# Patient Record
Sex: MALE | Race: BLACK OR AFRICAN AMERICAN | Employment: UNEMPLOYED | URBAN - METROPOLITAN AREA
[De-identification: names, ages, dates, MRNs, and addresses within clinical notes are randomized per-mention and may not be internally consistent; named-entity substitution may affect disease eponyms.]

---

## 2019-05-22 ENCOUNTER — APPOINTMENT (OUTPATIENT)
Dept: GENERAL RADIOLOGY | Age: 69
DRG: 100 | End: 2019-05-22
Attending: EMERGENCY MEDICINE
Payer: MEDICARE

## 2019-05-22 ENCOUNTER — APPOINTMENT (OUTPATIENT)
Dept: CT IMAGING | Age: 69
DRG: 100 | End: 2019-05-22
Attending: EMERGENCY MEDICINE
Payer: MEDICARE

## 2019-05-22 ENCOUNTER — HOSPITAL ENCOUNTER (INPATIENT)
Age: 69
LOS: 4 days | Discharge: HOME | DRG: 100 | End: 2019-05-26
Attending: EMERGENCY MEDICINE | Admitting: HOSPITALIST
Payer: MEDICARE

## 2019-05-22 DIAGNOSIS — R56.9 SEIZURE (HCC): Primary | ICD-10-CM

## 2019-05-22 DIAGNOSIS — E72.20 HYPERAMMONEMIA (HCC): ICD-10-CM

## 2019-05-22 DIAGNOSIS — G83.84 TODD'S PARALYSIS (HCC): ICD-10-CM

## 2019-05-22 PROBLEM — E87.6 HYPOKALEMIA: Status: ACTIVE | Noted: 2019-05-22

## 2019-05-22 PROBLEM — J96.00 ACUTE RESPIRATORY FAILURE (HCC): Status: ACTIVE | Noted: 2019-05-22

## 2019-05-22 PROBLEM — J69.0 ASPIRATION PNEUMONIA (HCC): Status: ACTIVE | Noted: 2019-05-22

## 2019-05-22 PROBLEM — E83.51 HYPOCALCEMIA: Status: ACTIVE | Noted: 2019-05-22

## 2019-05-22 LAB
ALBUMIN SERPL-MCNC: 2 G/DL (ref 3.4–5)
ALBUMIN/GLOB SERPL: 0.9 {RATIO} (ref 0.8–1.7)
ALP SERPL-CCNC: 66 U/L (ref 45–117)
ALT SERPL-CCNC: 20 U/L (ref 16–61)
AMMONIA PLAS-SCNC: 122 UMOL/L (ref 11–32)
AMPHET UR QL SCN: NEGATIVE
ANION GAP SERPL CALC-SCNC: 16 MMOL/L (ref 3–18)
ANION GAP SERPL CALC-SCNC: 8 MMOL/L (ref 3–18)
APAP SERPL-MCNC: 6 UG/ML (ref 10–30)
APPEARANCE UR: CLEAR
ARTERIAL PATENCY WRIST A: YES
AST SERPL-CCNC: 26 U/L (ref 15–37)
ATRIAL RATE: 100 BPM
ATRIAL RATE: 120 BPM
BACTERIA URNS QL MICRO: ABNORMAL /HPF
BARBITURATES UR QL SCN: NEGATIVE
BASE DEFICIT BLD-SCNC: 11 MMOL/L
BASOPHILS # BLD: 0 K/UL (ref 0–0.1)
BASOPHILS NFR BLD: 0 % (ref 0–2)
BDY SITE: ABNORMAL
BENZODIAZ UR QL: NEGATIVE
BILIRUB SERPL-MCNC: 0.2 MG/DL (ref 0.2–1)
BILIRUB UR QL: NEGATIVE
BODY TEMPERATURE: 98.6
BUN SERPL-MCNC: 10 MG/DL (ref 7–18)
BUN SERPL-MCNC: 14 MG/DL (ref 7–18)
BUN/CREAT SERPL: 10 (ref 12–20)
BUN/CREAT SERPL: 13 (ref 12–20)
CALCIUM SERPL-MCNC: 5.3 MG/DL (ref 8.5–10.1)
CALCIUM SERPL-MCNC: 8.2 MG/DL (ref 8.5–10.1)
CALCULATED P AXIS, ECG09: 72 DEGREES
CALCULATED P AXIS, ECG09: 77 DEGREES
CALCULATED R AXIS, ECG10: -90 DEGREES
CALCULATED R AXIS, ECG10: 15 DEGREES
CALCULATED T AXIS, ECG11: 64 DEGREES
CALCULATED T AXIS, ECG11: 72 DEGREES
CANNABINOIDS UR QL SCN: POSITIVE
CHLORIDE SERPL-SCNC: 110 MMOL/L (ref 100–108)
CHLORIDE SERPL-SCNC: 121 MMOL/L (ref 100–108)
CK MB CFR SERPL CALC: 0.8 % (ref 0–4)
CK MB CFR SERPL CALC: ABNORMAL % (ref 0–4)
CK MB SERPL-MCNC: 12 NG/ML (ref 5–25)
CK MB SERPL-MCNC: <1 NG/ML (ref 5–25)
CK SERPL-CCNC: 1466 U/L (ref 39–308)
CK SERPL-CCNC: 92 U/L (ref 39–308)
CO2 SERPL-SCNC: 11 MMOL/L (ref 21–32)
CO2 SERPL-SCNC: 24 MMOL/L (ref 21–32)
COCAINE UR QL SCN: NEGATIVE
COLOR UR: YELLOW
CREAT SERPL-MCNC: 0.77 MG/DL (ref 0.6–1.3)
CREAT SERPL-MCNC: 1.37 MG/DL (ref 0.6–1.3)
DIAGNOSIS, 93000: NORMAL
DIAGNOSIS, 93000: NORMAL
DIFFERENTIAL METHOD BLD: ABNORMAL
EOSINOPHIL # BLD: 0.1 K/UL (ref 0–0.4)
EOSINOPHIL NFR BLD: 1 % (ref 0–5)
EPITH CASTS URNS QL MICRO: ABNORMAL /LPF (ref 0–5)
ERYTHROCYTE [DISTWIDTH] IN BLOOD BY AUTOMATED COUNT: 12.9 % (ref 11.6–14.5)
EST. AVERAGE GLUCOSE BLD GHB EST-MCNC: 103 MG/DL
ETHANOL SERPL-MCNC: <3 MG/DL (ref 0–3)
GAS FLOW.O2 O2 DELIVERY SYS: ABNORMAL L/MIN
GAS FLOW.O2 SETTING OXYMISER: 14 BPM
GLOBULIN SER CALC-MCNC: 2.2 G/DL (ref 2–4)
GLUCOSE BLD STRIP.AUTO-MCNC: 102 MG/DL (ref 70–110)
GLUCOSE BLD STRIP.AUTO-MCNC: 84 MG/DL (ref 70–110)
GLUCOSE SERPL-MCNC: 140 MG/DL (ref 74–99)
GLUCOSE SERPL-MCNC: 83 MG/DL (ref 74–99)
GLUCOSE UR STRIP.AUTO-MCNC: NEGATIVE MG/DL
HBA1C MFR BLD: 5.2 % (ref 4.2–5.6)
HCO3 BLD-SCNC: 18.6 MMOL/L (ref 22–26)
HCT VFR BLD AUTO: 31.4 % (ref 36–48)
HDSCOM,HDSCOM: ABNORMAL
HGB BLD-MCNC: 10 G/DL (ref 13–16)
HGB UR QL STRIP: ABNORMAL
INSPIRATION.DURATION SETTING TIME VENT: 1 SEC
KETONES UR QL STRIP.AUTO: NEGATIVE MG/DL
LACTATE SERPL-SCNC: 1.9 MMOL/L (ref 0.4–2)
LEUKOCYTE ESTERASE UR QL STRIP.AUTO: NEGATIVE
LYMPHOCYTES # BLD: 1.3 K/UL (ref 0.9–3.6)
LYMPHOCYTES NFR BLD: 14 % (ref 21–52)
MAGNESIUM SERPL-MCNC: 1.6 MG/DL (ref 1.6–2.6)
MCH RBC QN AUTO: 29.9 PG (ref 24–34)
MCHC RBC AUTO-ENTMCNC: 31.8 G/DL (ref 31–37)
MCV RBC AUTO: 93.7 FL (ref 74–97)
METHADONE UR QL: NEGATIVE
MONOCYTES # BLD: 0.2 K/UL (ref 0.05–1.2)
MONOCYTES NFR BLD: 2 % (ref 3–10)
MUCOUS THREADS URNS QL MICRO: ABNORMAL /LPF
NEUTS SEG # BLD: 7.7 K/UL (ref 1.8–8)
NEUTS SEG NFR BLD: 83 % (ref 40–73)
NITRITE UR QL STRIP.AUTO: NEGATIVE
O2/TOTAL GAS SETTING VFR VENT: 100 %
OPIATES UR QL: NEGATIVE
P-R INTERVAL, ECG05: 156 MS
P-R INTERVAL, ECG05: 206 MS
PCO2 BLD: 56.1 MMHG (ref 35–45)
PCP UR QL: NEGATIVE
PEEP RESPIRATORY: 5 CMH2O
PH BLD: 7.13 [PH] (ref 7.35–7.45)
PH UR STRIP: 5.5 [PH] (ref 5–8)
PLATELET # BLD AUTO: 158 K/UL (ref 135–420)
PMV BLD AUTO: 9.3 FL (ref 9.2–11.8)
PO2 BLD: 285 MMHG (ref 80–100)
POTASSIUM SERPL-SCNC: 3.2 MMOL/L (ref 3.5–5.5)
POTASSIUM SERPL-SCNC: 4.5 MMOL/L (ref 3.5–5.5)
PROT SERPL-MCNC: 4.2 G/DL (ref 6.4–8.2)
PROT UR STRIP-MCNC: 300 MG/DL
Q-T INTERVAL, ECG07: 300 MS
Q-T INTERVAL, ECG07: 356 MS
QRS DURATION, ECG06: 100 MS
QRS DURATION, ECG06: 102 MS
QTC CALCULATION (BEZET), ECG08: 424 MS
QTC CALCULATION (BEZET), ECG08: 459 MS
RBC # BLD AUTO: 3.35 M/UL (ref 4.7–5.5)
RBC #/AREA URNS HPF: ABNORMAL /HPF (ref 0–5)
SALICYLATES SERPL-MCNC: 2.8 MG/DL (ref 2.8–20)
SAO2 % BLD: 100 % (ref 92–97)
SERVICE CMNT-IMP: ABNORMAL
SODIUM SERPL-SCNC: 142 MMOL/L (ref 136–145)
SODIUM SERPL-SCNC: 148 MMOL/L (ref 136–145)
SP GR UR REFRACTOMETRY: 1.02 (ref 1–1.03)
SPECIMEN TYPE: ABNORMAL
TOTAL RESP. RATE, ITRR: 14
TROPONIN I SERPL-MCNC: 0.08 NG/ML (ref 0–0.04)
TROPONIN I SERPL-MCNC: 0.35 NG/ML (ref 0–0.04)
UROBILINOGEN UR QL STRIP.AUTO: 0.2 EU/DL (ref 0.2–1)
VENTILATION MODE VENT: ABNORMAL
VENTRICULAR RATE, ECG03: 100 BPM
VENTRICULAR RATE, ECG03: 120 BPM
VT SETTING VENT: 450 ML
WBC # BLD AUTO: 9.3 K/UL (ref 4.6–13.2)
WBC URNS QL MICRO: ABNORMAL /HPF (ref 0–5)

## 2019-05-22 PROCEDURE — 85025 COMPLETE CBC W/AUTO DIFF WBC: CPT

## 2019-05-22 PROCEDURE — 96375 TX/PRO/DX INJ NEW DRUG ADDON: CPT

## 2019-05-22 PROCEDURE — 74011000250 HC RX REV CODE- 250: Performed by: HOSPITALIST

## 2019-05-22 PROCEDURE — 80185 ASSAY OF PHENYTOIN TOTAL: CPT

## 2019-05-22 PROCEDURE — 74011250636 HC RX REV CODE- 250/636

## 2019-05-22 PROCEDURE — 5A1935Z RESPIRATORY VENTILATION, LESS THAN 24 CONSECUTIVE HOURS: ICD-10-PCS | Performed by: HOSPITALIST

## 2019-05-22 PROCEDURE — 77010033678 HC OXYGEN DAILY

## 2019-05-22 PROCEDURE — 82962 GLUCOSE BLOOD TEST: CPT

## 2019-05-22 PROCEDURE — 0BH17EZ INSERTION OF ENDOTRACHEAL AIRWAY INTO TRACHEA, VIA NATURAL OR ARTIFICIAL OPENING: ICD-10-PCS | Performed by: EMERGENCY MEDICINE

## 2019-05-22 PROCEDURE — 82803 BLOOD GASES ANY COMBINATION: CPT

## 2019-05-22 PROCEDURE — 87070 CULTURE OTHR SPECIMN AEROBIC: CPT

## 2019-05-22 PROCEDURE — 74011000250 HC RX REV CODE- 250: Performed by: EMERGENCY MEDICINE

## 2019-05-22 PROCEDURE — 80053 COMPREHEN METABOLIC PANEL: CPT

## 2019-05-22 PROCEDURE — 82550 ASSAY OF CK (CPK): CPT

## 2019-05-22 PROCEDURE — 93005 ELECTROCARDIOGRAM TRACING: CPT

## 2019-05-22 PROCEDURE — 74011000250 HC RX REV CODE- 250: Performed by: INTERNAL MEDICINE

## 2019-05-22 PROCEDURE — 31500 INSERT EMERGENCY AIRWAY: CPT

## 2019-05-22 PROCEDURE — 74011250636 HC RX REV CODE- 250/636: Performed by: INTERNAL MEDICINE

## 2019-05-22 PROCEDURE — 81001 URINALYSIS AUTO W/SCOPE: CPT

## 2019-05-22 PROCEDURE — 99285 EMERGENCY DEPT VISIT HI MDM: CPT

## 2019-05-22 PROCEDURE — 80307 DRUG TEST PRSMV CHEM ANLYZR: CPT

## 2019-05-22 PROCEDURE — 87106 FUNGI IDENTIFICATION YEAST: CPT

## 2019-05-22 PROCEDURE — 74011000258 HC RX REV CODE- 258: Performed by: HOSPITALIST

## 2019-05-22 PROCEDURE — 02HV33Z INSERTION OF INFUSION DEVICE INTO SUPERIOR VENA CAVA, PERCUTANEOUS APPROACH: ICD-10-PCS | Performed by: EMERGENCY MEDICINE

## 2019-05-22 PROCEDURE — 70450 CT HEAD/BRAIN W/O DYE: CPT

## 2019-05-22 PROCEDURE — 74011000258 HC RX REV CODE- 258: Performed by: INTERNAL MEDICINE

## 2019-05-22 PROCEDURE — 71045 X-RAY EXAM CHEST 1 VIEW: CPT

## 2019-05-22 PROCEDURE — 36415 COLL VENOUS BLD VENIPUNCTURE: CPT

## 2019-05-22 PROCEDURE — 96365 THER/PROPH/DIAG IV INF INIT: CPT

## 2019-05-22 PROCEDURE — 74011250636 HC RX REV CODE- 250/636: Performed by: EMERGENCY MEDICINE

## 2019-05-22 PROCEDURE — 95816 EEG AWAKE AND DROWSY: CPT | Performed by: HOSPITALIST

## 2019-05-22 PROCEDURE — 94002 VENT MGMT INPAT INIT DAY: CPT

## 2019-05-22 PROCEDURE — 83735 ASSAY OF MAGNESIUM: CPT

## 2019-05-22 PROCEDURE — 74011250636 HC RX REV CODE- 250/636: Performed by: HOSPITALIST

## 2019-05-22 PROCEDURE — 36600 WITHDRAWAL OF ARTERIAL BLOOD: CPT

## 2019-05-22 PROCEDURE — 83036 HEMOGLOBIN GLYCOSYLATED A1C: CPT

## 2019-05-22 PROCEDURE — 82140 ASSAY OF AMMONIA: CPT

## 2019-05-22 PROCEDURE — 87040 BLOOD CULTURE FOR BACTERIA: CPT

## 2019-05-22 PROCEDURE — 65610000006 HC RM INTENSIVE CARE

## 2019-05-22 PROCEDURE — 96361 HYDRATE IV INFUSION ADD-ON: CPT

## 2019-05-22 PROCEDURE — 83605 ASSAY OF LACTIC ACID: CPT

## 2019-05-22 RX ORDER — PROPOFOL 10 MG/ML
0-50 VIAL (ML) INTRAVENOUS CONTINUOUS
Status: DISCONTINUED | OUTPATIENT
Start: 2019-05-22 | End: 2019-05-24

## 2019-05-22 RX ORDER — LEVETIRACETAM 10 MG/ML
1000 INJECTION INTRAVASCULAR EVERY 12 HOURS
Status: DISCONTINUED | OUTPATIENT
Start: 2019-05-22 | End: 2019-05-22 | Stop reason: ALTCHOICE

## 2019-05-22 RX ORDER — CHLORHEXIDINE GLUCONATE 1.2 MG/ML
10 RINSE ORAL EVERY 12 HOURS
Status: DISCONTINUED | OUTPATIENT
Start: 2019-05-22 | End: 2019-05-23

## 2019-05-22 RX ORDER — FENTANYL CITRATE 50 UG/ML
25 INJECTION, SOLUTION INTRAMUSCULAR; INTRAVENOUS
Status: DISCONTINUED | OUTPATIENT
Start: 2019-05-22 | End: 2019-05-26 | Stop reason: HOSPADM

## 2019-05-22 RX ORDER — LEVETIRACETAM 10 MG/ML
1000 INJECTION INTRAVASCULAR
Status: COMPLETED | OUTPATIENT
Start: 2019-05-22 | End: 2019-05-22

## 2019-05-22 RX ORDER — HEPARIN SODIUM 5000 [USP'U]/ML
5000 INJECTION, SOLUTION INTRAVENOUS; SUBCUTANEOUS EVERY 8 HOURS
Status: DISCONTINUED | OUTPATIENT
Start: 2019-05-22 | End: 2019-05-23

## 2019-05-22 RX ORDER — DEXTROSE 50 % IN WATER (D50W) INTRAVENOUS SYRINGE
25-50 AS NEEDED
Status: DISCONTINUED | OUTPATIENT
Start: 2019-05-22 | End: 2019-05-26 | Stop reason: HOSPADM

## 2019-05-22 RX ORDER — IPRATROPIUM BROMIDE AND ALBUTEROL SULFATE 2.5; .5 MG/3ML; MG/3ML
3 SOLUTION RESPIRATORY (INHALATION)
Status: DISCONTINUED | OUTPATIENT
Start: 2019-05-22 | End: 2019-05-26 | Stop reason: HOSPADM

## 2019-05-22 RX ORDER — LACOSAMIDE 100 MG/1
100 TABLET ORAL 2 TIMES DAILY
COMMUNITY
End: 2019-05-26

## 2019-05-22 RX ORDER — MAGNESIUM SULFATE 100 %
4 CRYSTALS MISCELLANEOUS AS NEEDED
Status: DISCONTINUED | OUTPATIENT
Start: 2019-05-22 | End: 2019-05-26 | Stop reason: HOSPADM

## 2019-05-22 RX ORDER — PROPOFOL 10 MG/ML
INJECTION, EMULSION INTRAVENOUS
Status: COMPLETED
Start: 2019-05-22 | End: 2019-05-22

## 2019-05-22 RX ORDER — CHLORHEXIDINE GLUCONATE 1.2 MG/ML
10 RINSE ORAL ONCE
Status: COMPLETED | OUTPATIENT
Start: 2019-05-22 | End: 2019-05-22

## 2019-05-22 RX ORDER — IPRATROPIUM BROMIDE AND ALBUTEROL SULFATE 2.5; .5 MG/3ML; MG/3ML
3 SOLUTION RESPIRATORY (INHALATION)
Status: DISCONTINUED | OUTPATIENT
Start: 2019-05-22 | End: 2019-05-22 | Stop reason: SDUPTHER

## 2019-05-22 RX ORDER — CLINDAMYCIN PHOSPHATE 600 MG/50ML
600 INJECTION, SOLUTION INTRAVENOUS EVERY 8 HOURS
Status: DISCONTINUED | OUTPATIENT
Start: 2019-05-22 | End: 2019-05-23

## 2019-05-22 RX ORDER — MIDAZOLAM HYDROCHLORIDE 1 MG/ML
1 INJECTION, SOLUTION INTRAMUSCULAR; INTRAVENOUS
Status: DISCONTINUED | OUTPATIENT
Start: 2019-05-22 | End: 2019-05-26 | Stop reason: HOSPADM

## 2019-05-22 RX ORDER — POTASSIUM CHLORIDE 7.45 MG/ML
10 INJECTION INTRAVENOUS
Status: COMPLETED | OUTPATIENT
Start: 2019-05-22 | End: 2019-05-23

## 2019-05-22 RX ORDER — INSULIN LISPRO 100 [IU]/ML
INJECTION, SOLUTION INTRAVENOUS; SUBCUTANEOUS
Status: DISCONTINUED | OUTPATIENT
Start: 2019-05-22 | End: 2019-05-22

## 2019-05-22 RX ORDER — ETOMIDATE 2 MG/ML
20 INJECTION INTRAVENOUS
Status: COMPLETED | OUTPATIENT
Start: 2019-05-22 | End: 2019-05-22

## 2019-05-22 RX ORDER — ROCURONIUM BROMIDE 10 MG/ML
0.6 INJECTION, SOLUTION INTRAVENOUS
Status: COMPLETED | OUTPATIENT
Start: 2019-05-22 | End: 2019-05-22

## 2019-05-22 RX ORDER — INSULIN LISPRO 100 [IU]/ML
INJECTION, SOLUTION INTRAVENOUS; SUBCUTANEOUS EVERY 6 HOURS
Status: DISCONTINUED | OUTPATIENT
Start: 2019-05-23 | End: 2019-05-24

## 2019-05-22 RX ORDER — LEVETIRACETAM 750 MG/1
1500 TABLET ORAL 2 TIMES DAILY
COMMUNITY
End: 2019-05-26

## 2019-05-22 RX ORDER — DEXTROSE MONOHYDRATE AND SODIUM CHLORIDE 5; .45 G/100ML; G/100ML
125 INJECTION, SOLUTION INTRAVENOUS CONTINUOUS
Status: DISCONTINUED | OUTPATIENT
Start: 2019-05-22 | End: 2019-05-24

## 2019-05-22 RX ORDER — SODIUM CHLORIDE 9 MG/ML
125 INJECTION, SOLUTION INTRAVENOUS CONTINUOUS
Status: DISCONTINUED | OUTPATIENT
Start: 2019-05-22 | End: 2019-05-22

## 2019-05-22 RX ORDER — LEVETIRACETAM 15 MG/ML
1500 INJECTION INTRAVASCULAR EVERY 12 HOURS
Status: DISCONTINUED | OUTPATIENT
Start: 2019-05-22 | End: 2019-05-23

## 2019-05-22 RX ORDER — POTASSIUM CHLORIDE 7.45 MG/ML
INJECTION INTRAVENOUS
Status: DISPENSED
Start: 2019-05-22 | End: 2019-05-23

## 2019-05-22 RX ADMIN — LEVETIRACETAM 1000 MG: 10 INJECTION INTRAVENOUS at 13:55

## 2019-05-22 RX ADMIN — POTASSIUM CHLORIDE 10 MEQ: 10 INJECTION, SOLUTION INTRAVENOUS at 19:06

## 2019-05-22 RX ADMIN — LEVETIRACETAM 1500 MG: 15 INJECTION INTRAVENOUS at 22:36

## 2019-05-22 RX ADMIN — SODIUM CHLORIDE 1000 ML: 900 INJECTION, SOLUTION INTRAVENOUS at 14:37

## 2019-05-22 RX ADMIN — PROPOFOL 40 MCG/KG/MIN: 10 INJECTION, EMULSION INTRAVENOUS at 22:41

## 2019-05-22 RX ADMIN — DEXTROSE MONOHYDRATE AND SODIUM CHLORIDE 125 ML/HR: 5; .45 INJECTION, SOLUTION INTRAVENOUS at 18:38

## 2019-05-22 RX ADMIN — ETOMIDATE 20 MG: 2 INJECTION, SOLUTION INTRAVENOUS at 13:31

## 2019-05-22 RX ADMIN — POTASSIUM CHLORIDE 10 MEQ: 10 INJECTION, SOLUTION INTRAVENOUS at 17:56

## 2019-05-22 RX ADMIN — CHLORHEXIDINE GLUCONATE 10 ML: 1.2 RINSE ORAL at 17:57

## 2019-05-22 RX ADMIN — SODIUM CHLORIDE 100 ML/HR: 900 INJECTION, SOLUTION INTRAVENOUS at 15:28

## 2019-05-22 RX ADMIN — NOREPINEPHRINE BITARTRATE 2 MCG/MIN: 1 INJECTION, SOLUTION, CONCENTRATE INTRAVENOUS at 15:59

## 2019-05-22 RX ADMIN — CALCIUM GLUCONATE 2 G: 94 INJECTION, SOLUTION INTRAVENOUS at 16:21

## 2019-05-22 RX ADMIN — PHENYTOIN SODIUM 300 MG: 50 INJECTION INTRAMUSCULAR; INTRAVENOUS at 23:54

## 2019-05-22 RX ADMIN — CHLORHEXIDINE GLUCONATE 10 ML: 1.2 RINSE ORAL at 20:36

## 2019-05-22 RX ADMIN — PIPERACILLIN SODIUM,TAZOBACTAM SODIUM 3.38 G: 3; .375 INJECTION, POWDER, FOR SOLUTION INTRAVENOUS at 20:35

## 2019-05-22 RX ADMIN — CLINDAMYCIN PHOSPHATE 600 MG: 600 INJECTION, SOLUTION INTRAVENOUS at 23:55

## 2019-05-22 RX ADMIN — PROPOFOL 10 MCG/KG/MIN: 10 INJECTION, EMULSION INTRAVENOUS at 15:16

## 2019-05-22 RX ADMIN — HEPARIN SODIUM 5000 UNITS: 5000 INJECTION INTRAVENOUS; SUBCUTANEOUS at 17:56

## 2019-05-22 RX ADMIN — SODIUM CHLORIDE 1000 ML: 900 INJECTION, SOLUTION INTRAVENOUS at 13:53

## 2019-05-22 RX ADMIN — FOLIC ACID: 5 INJECTION, SOLUTION INTRAMUSCULAR; INTRAVENOUS; SUBCUTANEOUS at 15:24

## 2019-05-22 RX ADMIN — ROCURONIUM BROMIDE 75 MG: 50 INJECTION, SOLUTION INTRAVENOUS at 13:31

## 2019-05-22 RX ADMIN — POTASSIUM CHLORIDE 10 MEQ: 10 INJECTION, SOLUTION INTRAVENOUS at 22:37

## 2019-05-22 RX ADMIN — SODIUM CHLORIDE 1000 ML: 900 INJECTION, SOLUTION INTRAVENOUS at 13:37

## 2019-05-22 RX ADMIN — CLINDAMYCIN PHOSPHATE 600 MG: 600 INJECTION, SOLUTION INTRAVENOUS at 17:55

## 2019-05-22 RX ADMIN — PIPERACILLIN SODIUM,TAZOBACTAM SODIUM 3.38 G: 3; .375 INJECTION, POWDER, FOR SOLUTION INTRAVENOUS at 16:18

## 2019-05-22 RX ADMIN — POTASSIUM CHLORIDE 10 MEQ: 10 INJECTION, SOLUTION INTRAVENOUS at 20:35

## 2019-05-22 RX ADMIN — PROPOFOL 40 MCG/KG/MIN: 10 INJECTION, EMULSION INTRAVENOUS at 18:01

## 2019-05-22 RX ADMIN — HEPARIN SODIUM 5000 UNITS: 5000 INJECTION INTRAVENOUS; SUBCUTANEOUS at 23:59

## 2019-05-22 NOTE — ED PROVIDER NOTES
EMERGENCY DEPARTMENT HISTORY AND PHYSICAL EXAM    Date: 5/22/2019  Patient Name: Summer Castelan    History of Presenting Illness     Chief Complaint   Patient presents with    Unresponsive         History Provided By: EMS    Additional History (Context):   Summer Castelan is a 79 y.o. male with PMHX of seizures presents to the emergency department after witnessed seizure. Unclear what patient takes for seizures and when his last seizure was. It is reported that the patient was sitting outside and when security pass noticed that the patient was having seizure. When paramedics arrived, patient received Ativan which stopped seizures. However on arrival, patient was noted to be hypoxic at 86% on nonrebreather. Minimally responsive, only opening his eyes and nodding. Unable to verbalize history. On assessment, noted to have left-sided flaccid paralysis. Glucose in the 200s. Initially noted to be hypertensive however became hypotensive. PCP: Bryan, MD Naveen        Past History     Past Medical History:  No past medical history on file. Past Surgical History:  No past surgical history on file. Family History:  No family history on file. Social History:  Social History     Tobacco Use    Smoking status: Not on file   Substance Use Topics    Alcohol use: Not on file    Drug use: Not on file       Allergies:  Not on File      Review of Systems   Review of Systems   Unable to perform ROS: Patient unresponsive       Physical Exam     Vitals:    05/22/19 1430 05/22/19 1435 05/22/19 1440 05/22/19 1446   BP: (!) 169/95 (!) 169/99 (!) 170/106    Pulse: (!) 116 (!) 111 (!) 113    Resp: 18 19 22    Temp:    97.2 °F (36.2 °C)   SpO2: 95% 95% 94%    Weight:         Physical Exam    Nursing note and vitals reviewed      CONSTITUTIONAL: Well-developed, well-nourished individual HEAD: Normocephalic, atraumatic. EYES: Pupils are 4 mm bilaterally, reactive to light.   Eyelids, conjunctiva, iris, and sclera are normal.  EARS: External ears are normal.  NOSE: The nose is normal in appearance. MOUTH/DENTAL: Dry mucous membranes   Neck: The trachea is mid-line. The neck is supple and non-tender to palpation. There is no cervical lymphadenopathy. There is no JVD. CHEST: No evidence of trauma or deformity. Non-tender to palpation. CARDIOVASCULAR: Tachycardic, no murmurs. LUNGS: Equal breath sounds, no wheezes or rhonchi   GI/ABDOMEN: The abdomen is mildly distended in appearance. PELVIS: No evidence of trauma or deformity. Negative pelvic rock. no evidence of instability. MUSCULOSKELETAL: There are no deformities noted in all four extremities. NEUROLOGICAL: Moving his right upper and right lower extremities volitionally. Flaccid paralysis to his left upper and left lower extremity. Not moving his left side to pain. PSYCHOLOGICAL: Unresponsive.          Diagnostic Study Results     Labs -     Recent Results (from the past 12 hour(s))   EKG, 12 LEAD, INITIAL    Collection Time: 05/22/19  1:24 PM   Result Value Ref Range    Ventricular Rate 120 BPM    Atrial Rate 120 BPM    P-R Interval 156 ms    QRS Duration 100 ms    Q-T Interval 300 ms    QTC Calculation (Bezet) 424 ms    Calculated P Axis 72 degrees    Calculated R Axis -90 degrees    Calculated T Axis 72 degrees    Diagnosis       Sinus tachycardia  Right superior axis deviation  Incomplete right bundle branch block  Possible Right ventricular hypertrophy  Inferior infarct , age undetermined  Abnormal ECG  No previous ECGs available  Confirmed by Catalina Abreu MD, -- ([id number]) on 5/22/2019 1:54:47 PM     CBC WITH AUTOMATED DIFF    Collection Time: 05/22/19  1:39 PM   Result Value Ref Range    WBC 9.3 4.6 - 13.2 K/uL    RBC 3.35 (L) 4.70 - 5.50 M/uL    HGB 10.0 (L) 13.0 - 16.0 g/dL    HCT 31.4 (L) 36.0 - 48.0 %    MCV 93.7 74.0 - 97.0 FL    MCH 29.9 24.0 - 34.0 PG    MCHC 31.8 31.0 - 37.0 g/dL    RDW 12.9 11.6 - 14.5 %    PLATELET 584 487 - 705 K/uL    MPV 9.3 9.2 - 11.8 FL    NEUTROPHILS 83 (H) 40 - 73 %    LYMPHOCYTES 14 (L) 21 - 52 %    MONOCYTES 2 (L) 3 - 10 %    EOSINOPHILS 1 0 - 5 %    BASOPHILS 0 0 - 2 %    ABS. NEUTROPHILS 7.7 1.8 - 8.0 K/UL    ABS. LYMPHOCYTES 1.3 0.9 - 3.6 K/UL    ABS. MONOCYTES 0.2 0.05 - 1.2 K/UL    ABS. EOSINOPHILS 0.1 0.0 - 0.4 K/UL    ABS. BASOPHILS 0.0 0.0 - 0.1 K/UL    DF AUTOMATED     METABOLIC PANEL, COMPREHENSIVE    Collection Time: 05/22/19  1:39 PM   Result Value Ref Range    Sodium 148 (H) 136 - 145 mmol/L    Potassium 3.2 (L) 3.5 - 5.5 mmol/L    Chloride 121 (H) 100 - 108 mmol/L    CO2 11 (L) 21 - 32 mmol/L    Anion gap 16 3.0 - 18 mmol/L    Glucose 140 (H) 74 - 99 mg/dL    BUN 10 7.0 - 18 MG/DL    Creatinine 0.77 0.6 - 1.3 MG/DL    BUN/Creatinine ratio 13 12 - 20      GFR est AA >60 >60 ml/min/1.73m2    GFR est non-AA >60 >60 ml/min/1.73m2    Calcium 5.3 (LL) 8.5 - 10.1 MG/DL    Bilirubin, total 0.2 0.2 - 1.0 MG/DL    ALT (SGPT) 20 16 - 61 U/L    AST (SGOT) 26 15 - 37 U/L    Alk.  phosphatase 66 45 - 117 U/L    Protein, total 4.2 (L) 6.4 - 8.2 g/dL    Albumin 2.0 (L) 3.4 - 5.0 g/dL    Globulin 2.2 2.0 - 4.0 g/dL    A-G Ratio 0.9 0.8 - 1.7     MAGNESIUM    Collection Time: 05/22/19  1:39 PM   Result Value Ref Range    Magnesium 1.6 1.6 - 2.6 mg/dL   CARDIAC PANEL,(CK, CKMB & TROPONIN)    Collection Time: 05/22/19  1:39 PM   Result Value Ref Range    CK 92 39 - 308 U/L    CK - MB <1.0 <3.6 ng/ml    CK-MB Index  0.0 - 4.0 %     CALCULATION NOT PERFORMED WHEN RESULT IS BELOW LINEAR LIMIT    Troponin-I, QT 0.08 (H) 0.0 - 0.045 NG/ML   AMMONIA    Collection Time: 05/22/19  1:39 PM   Result Value Ref Range    Ammonia 122 (H) 11 - 32 UMOL/L   POC G3    Collection Time: 05/22/19  2:04 PM   Result Value Ref Range    Device: VENT      FIO2 (POC) 100 %    pH (POC) 7.127 (LL) 7.35 - 7.45      pCO2 (POC) 56.1 (H) 35.0 - 45.0 MMHG    pO2 (POC) 285 (H) 80 - 100 MMHG    HCO3 (POC) 18.6 (L) 22 - 26 MMOL/L    sO2 (POC) 100 (H) 92 - 97 %    Base deficit (POC) 11 mmol/L    Mode ASSIST CONTROL      Tidal volume 450 ml    Set Rate 14 bpm    PEEP/CPAP (POC) 5.0 cmH2O    Allens test (POC) YES      Inspiratory Time 1.0 sec    Total resp. rate 14      Site RIGHT RADIAL      Patient temp. 98.6      Specimen type (POC) ARTERIAL      Performed by Sugey Burns W/ KAMI MICROSCOPIC    Collection Time: 05/22/19  2:27 PM   Result Value Ref Range    Color YELLOW      Appearance CLEAR      Specific gravity 1.018 1.005 - 1.030      pH (UA) 5.5 5.0 - 8.0      Protein 300 (A) NEG mg/dL    Glucose NEGATIVE  NEG mg/dL    Ketone NEGATIVE  NEG mg/dL    Bilirubin NEGATIVE  NEG      Blood MODERATE (A) NEG      Urobilinogen 0.2 0.2 - 1.0 EU/dL    Nitrites NEGATIVE  NEG      Leukocyte Esterase NEGATIVE  NEG         Radiologic Studies -   CT HEAD WO CONT   Final Result         1. No acute intracranial abnormality. 2. Mild periventricular white matter low-attenuation; nonspecific finding   favored to reflect sequela of chronic ischemic microvascular change. CRITICAL RESULT:  CODE S results called to Dr. Charles Alvarado in the emergency room   prior to dictation at 1425 hours on 5/22/2019      XR CHEST PORT    (Results Pending)     CT Results  (Last 48 hours)               05/22/19 1421  CT HEAD WO CONT Final result    Impression:          1. No acute intracranial abnormality. 2. Mild periventricular white matter low-attenuation; nonspecific finding   favored to reflect sequela of chronic ischemic microvascular change. CRITICAL RESULT:  CODE S results called to Dr. Charles Alvarado in the emergency room   prior to dictation at 1425 hours on 5/22/2019       Narrative:  EXAM: CT head       INDICATION: Seizure, patient unresponsive. COMPARISON: None.        TECHNIQUE: Axial CT imaging of the head was performed without intravenous   contrast.       One or more dose reduction techniques were used on this CT: automated exposure   control, adjustment of the mAs and/or kVp according to patient size, and   iterative reconstruction techniques. The specific techniques used on this CT   exam have been documented in the patient's electronic medical record. Digital   Imaging and Communications in Medicine (DICOM) format image data are available   to nonaffiliated external healthcare facilities or entities on a secure, media   free, reciprocally searchable basis with patient authorization for at least a   12-month period after this study. _______________       FINDINGS:       BRAIN AND POSTERIOR FOSSA: Cortical sulci volume is within normal limits for   patient age. The ventricular size and configuration is normal. Physiologic   bilateral basal ganglia calcifications are present. There is no intracranial   hemorrhage, mass effect, or midline shift. The gray-white matter differentiation   is within normal limits. Periventricular white matter low-attenuation is   present. EXTRA-AXIAL SPACES AND MENINGES: There are no abnormal extra-axial fluid   collections. CALVARIUM: Intact. SINUSES: Clear. OTHER: None.       _______________               CXR Results  (Last 48 hours)    None            Medical Decision Making   I am the first provider for this patient. I reviewed the vital signs, available nursing notes, past medical history, past surgical history, family history and social history. Vital Signs-Reviewed the patient's vital signs. Pulse Oximetry Analysis -86 % on nonrebreather    Cardiac Monitor:  Rate: 113 bpm  Rhythm: Regular    EKG interpretation: (Preliminary)  2:05 PM  Sinus tachycardia 120 bpm.  QTc 424 ms. Right bundle branch block. 2:52 PM normal sinus rhythm 100 bpm.  QTc 459 ms. Right bundle branch block    Records Reviewed: Nursing Notes and Old Medical Records    Provider Notes:   79 y.o. male presenting after witnessed seizure. On assessment, patient noted to be hypertensive, tachycardic however afebrile.   Hypoxic on nonrebreather saturating 86%. Noted to also have left-sided flaccid paralysis, possibly secondary to Pako's paralysis however noted to be hyportensive. Concern or apnea, unable to protect his airway. Patient intubated using etomidate and rocuronium. 7.5 tube using glide scope MAC 4.  24 at the lip.  code stroke initiated. Will initiate for any other line causes for his acute mental status. Will check ammonia, Tylenol, alcohol and salicylate levels. We will also obtain UDS. Will check cardiac enzymes and EKG. Will require admission to the ICU. Will load with 1 g of Keppra. Will consult neurology. Procedures:  Intubation  Date/Time: 5/22/2019 4:01 PM  Performed by: Alyson Chi DO  Authorized by: Alyson Chi DO     Consent:     Consent obtained:  Verbal    Consent given by:  Patient    Risks discussed:  Aspiration and bleeding  Pre-procedure details:     Patient status:  Unresponsive    Mallampati score:  II    Paralytics:  Rocuronium  Procedure details:     Preoxygenation:  Nonrebreather mask    CPR in progress: no      Intubation method:  Oral    Oral intubation technique:  Video-assisted    Laryngoscope blade: Mac 4    Tube size (mm):  7.5    Tube type:  Cuffed    Number of attempts:  1    Ventilation between attempts: no      Cricoid pressure: no      Tube visualized through cords: no    Placement assessment:     ETT to lip:  24    Tube secured with:  ETT pineda    Placement verification: chest rise, CXR verification and ETCO2 detector      CXR findings:  ETT in proper place  Post-procedure details:     Patient tolerance of procedure:   Tolerated well, no immediate complications    Central Line  Date/Time: 5/22/2019 4:04 PM  Performed by: Alyson Chi DO  Authorized by: Alyson Chi DO     Consent:     Consent obtained:  Emergent situation    Risks discussed:  Bleeding, infection and incorrect placement    Alternatives discussed:  No treatment and delayed treatment  Pre-procedure details:     Hand hygiene: Hand hygiene performed prior to insertion      Sterile barrier technique: All elements of maximal sterile technique followed      Skin preparation:  ChloraPrep    Skin preparation agent: Skin preparation agent completely dried prior to procedure    Anesthesia (see MAR for exact dosages): Anesthesia method:  None  Procedure details:     Location:  R internal jugular    Patient position:  Reverse Trendelenburg    Catheter size:  7 Fr    Landmarks identified: yes      Ultrasound guidance: yes      Sterile ultrasound techniques: Sterile gel and sterile probe covers were used      Number of attempts:  1    Successful placement: yes    Post-procedure details:     Post-procedure:  Dressing applied and line sutured    Assessment:  Blood return through all ports    Patient tolerance of procedure: Tolerated well, no immediate complications        ED Course:   1:15 PM   Initial assessment performed. The patients presenting problems have been discussed, and they are in agreement with the care plan formulated and outlined with them. I have encouraged them to ask questions as they arise throughout their visit. ED Course as of May 22 1448   Wed May 22, 2019   1342 1:42 PM Discussed patient's history, exam, and available diagnostics results with Dr. Dilia Steven tele-neurologist, who agree with imaging, EEG, MRI during ICU admission. Recommend Keppra. [PZ]   8979 2:21 PM Discussed patient's history, exam, and available diagnostics results with Baltazar Morales MD (Neurology) who will call in for a stat EEG      [CA]   6740 Spoke to daughter who does not live in that state. She reports that her father does have a history of epilepsy however unsure what medications he takes for it. States that his seizures are triggered by stress. States that the patient has been living in [place] for approximately 1 year.     2:27 PM Discussed patient's history, exam, and available diagnostics results with Ramin Maxwell  Radiologist.  No acute intracranial hemorrhage. [CA]   46 2:36 PM Discussed patient's history, exam, and available diagnostics results with Chai Vivas MD (Intensivist)who agree with admission ICU      [CA]      ED Course User Index  [CA] Cliff Hatfield DO Bria     After intubation, patient noted to be hypertensive, attempting to reach for the ET tube. Patient moving all 4 extremities on his own volition. Clinical suspicion for Pako's paralysis from his initial flaccid paralysis. CT negative for intracranial hemorrhage. Started patient on soft restraint and propofol drip for sedation. Patient given 1 g of Keppra. Diagnosis and Disposition     5:35 PM  I have spent 75 minutes of critical care time involved in lab review, consultations with specialist, family decision-making, and documentation. During this entire length of time I was immediately available to the patient. Critical Care: The reason for providing this level of medical care for this critically ill patient was due a critical illness that impaired one or more vital organ systems such that there was a high probability of imminent or life threatening deterioration in the patients condition. This care involved high complexity decision making to assess, manipulate, and support vital system functions, to treat this degreee vital organ system failure and to prevent further life threatening deterioration of the patients condition. Core Measures:  For Hospitalized Patients:    1. Hospitalization Decision Time:  The decision to hospitalize the patient was made by Morales Vanegas DO at 1:36 PM on 5/22/2019    2.  Aspirin: Aspirin was not given because the patient did not present with a stroke at the time of their Emergency Department evaluation    5:35 PM  Patient is being admitted to the hospital by Noemi Villagran MD. The results of their tests and reasons for their admission have been discussed with them and/or available family. They convey agreement and understanding for the need to be admitted and for their admission diagnosis. CONDITIONS ON ADMISSION:  Sepsis is not present at the time of admission. Deep Vein Urinary Tract Infection is not present at the time of admission. Pneumonia is not present at the time of admission. MRSA is not present at the time of admission. Wound infection is not present at the time of admission. Pressure Ulcer is not present at the time of admission. CLINICAL IMPRESSION:    1. Seizure ([facility] [address]    2. Hyperammonemia ([address]    3. Pako's paralysis ([facility] [address]      ____________________________________     Please note that this dictation was completed with [employer], the computer voice recognition software. Quite often unanticipated grammatical, syntax, homophones, and other interpretive errors are inadvertently transcribed by the computer software. Please disregard these errors. Please excuse any errors that have escaped final proofreading.

## 2019-05-22 NOTE — ROUTINE PROCESS
TRANSFER - OUT REPORT: 
 
Verbal report given to radha (name) on nIdy Person  being transferred to icu(unit) for routine progression of care Report consisted of patients Situation, Background, Assessment and  
Recommendations(SBAR). Information from the following report(s) ED Summary was reviewed with the receiving nurse. Lines:  
Peripheral IV 05/22/19 Left Forearm (Active) Site Assessment Clean, dry, & intact 5/22/2019  1:35 PM  
Phlebitis Assessment 0 5/22/2019  1:35 PM  
Infiltration Assessment 0 5/22/2019  1:35 PM  
Dressing Status Clean, dry, & intact 5/22/2019  1:35 PM  
   
Peripheral IV 05/22/19 Left Hand (Active) Site Assessment Clean, dry, & intact 5/22/2019  1:40 PM  
Phlebitis Assessment 0 5/22/2019  1:40 PM  
Infiltration Assessment 0 5/22/2019  1:40 PM  
Dressing Status Clean, dry, & intact 5/22/2019  1:40 PM  
  
 
Opportunity for questions and clarification was provided. Patient transported with: 
 Monitor

## 2019-05-22 NOTE — H&P
[facility] [place]  HISTORY AND PHYSICAL    Name:  Danni Diez  MR#:   [id number]  :  1950  ACCOUNT #:  [de-identified]  ADMIT DATE:  2019    ADMISSION DIAGNOSES:  1. Acute respiratory failure. 2.  Seizures. 3.  Pako's paralysis and left-sided weakness. 4.  Hypocalcemia. 5.  Hypokalemia. 6.  Possible sepsis. 7.  Aspiration pneumonia. 8.  Hyperammonemia. HISTORY OF PRESENT ILLNESS:  This is a 72-year-old -American gentleman who was noted to have a seizure outside today. It is uncertain where he actually was when paramedics were called. He received Ativan which stopped his seizures. However, on arrival, the patient was noted to be hypoxic at 86% on a non-rebreather with minimal responsiveness. He was unable to verbalize history. By the time he reached the emergency room, he had inability to protect his airway safely and thus was intubated. There was concern about left-sided flaccid paralysis. He had a CT scan done, a code S for stroke was called. CT scan came back unremarkable. Glucose was in the 200s. The patient was hypertensive, however, then became hypotensive. Fluid resuscitation helped normalize his blood pressure. The patient is now intubated and sedated and unable to give any other history. There are three bottles of medications, all appeared to be seizure medicines at the bedside. There are no family members currently there. I do not know about his other medical history at this point in time. All information is garnered from the chart and from what the ER learned about him also. As far as Care Everywhere, there is a clinical summary that notes his medications to be Dilantin and Keppra, his past medical history to include erectile dysfunction and seizures. His family history includes mother and father as well as a sister, all of whom are  from cancer. Social history, current everyday smoker, unknown how many per day.   No drug use, although he is positive for THC here and there is a note about alcohol use as well. He did receive a banana bag in the emergency room. His code status is full. His allergies are not known at this time, also what it looks like from his clinical summary at [facility], there is none noted there. It looks like his emergency contact is Artem Eastman, [phone] and there is no PCP listed. REVIEW OF SYSTEMS:  Not able to be obtained due to the patient's intubated and sedated status. PHYSICAL EXAMINATION:  GENERAL:  This is a tall, thin -American gentleman, 79years old. HEENT:  Pupils are constricted bilaterally. His sclerae mildly icteric. Oropharynx appears dry. He is intubated orally. NECK:  Supple. No masses or adenopathy noted. RESPIRATORY:  His lungs are coarse with scattered rhonchi bilaterally. Diminished breath sounds at the bases. CARDIOVASCULAR:  Regular rate and rhythm. No murmur, rub or gallop noted. ABDOMEN:  Flat, soft, nontender, no hepatosplenomegaly or distention. Diminished bowel sounds. PELVIC:  Normal male genitalia with a catheter in place. EXTREMITIES:  Lower extremities, distal pulses palpable. No clubbing, cyanosis or edema. He has restraints in both upper extremities. NEUROLOGIC:  He is not following commands due to sedation effect. I could not tell if he was going to try to move the left side or not, but the ER staff noted that he had recently which was a change from when he was not when he first came to the emergency room. No focal deficits or seizure activity noted at this point in time. LABORATORY DATA:  Labs show white count of 9.3, H and H 10 and 31.4, platelets are 340. Urinalysis shows 2+ mucus, 1+ bacteria, moderate blood, no nitrites or leukocyte esterase. Potassium 3.2, sodium 148, creatinine 0.77. Calcium is low at 5.3. Mag is 1.6, troponin 0.08. Ammonia level is 122. Tox screen positive for THC. Glucose 140.     DIAGNOSTIC DATA:  CT head, no acute intracranial abnormality. EKG shows normal sinus rhythm, incomplete right bundle-branch block. Chest x-ray, not able to be reviewed at this point, further radiologic review is pending. ASSESSMENT:  1. Acute respiratory failure. 2.  Possible sepsis and aspiration pneumonia. 3.  Seizures. 4.  Known seizure disorder. 5.  Hypokalemia. 6.  Hypocalcemia. 7.  Possible history of alcohol use. 8.  Tobacco use. 9.  Positive THC on urine drug screen. PLAN:  We are going to give him a banana bag daily for his history of alcohol use and electrolyte depletion. He has been given calcium gluconate as well as four runs of potassium ordered. We will repeat his basic metabolic panel at 32:43 p.m. tonight in two hours. Vent orders have been placed, antibiotics including Cleocin and Zosyn for aspiration pneumonia, stress ulcer prophylaxis with Protonix. For seizures, continue IV Keppra which was loaded in the emergency room. For blood sugar management, Accu-Cheks four times daily or every 6 hours with sliding scale insulin as needed. Next for hypotension, if he develops further hypotension, start Levophed and titrate to keep systolic above 90. Continue IV fluid resuscitation, normal saline 125 an hour, Diprivan for sedation as per Critical Care Pulmonology, the electrolyte repletion protocols have been placed. I am going to order cardiac enzymes every 6 hours for two more sets. I have ordered blood cultures. Lactate has been ordered stat and we will follow up a repeat CMP, ammonia level and CBC tomorrow morning. I have ordered an MRI of the brain to be done once he is medically stable to do that. He is n.p.o. for now, an OG tube can be placed. No oral medications necessary at this point in time. We will keep a close eye on his hemodynamics. He is being admitted to the ICU. A critical care note has been placed also. I spent 34 minutes on critical care time on this patient's admission.       JAI LEE Jan Padilla MD      [id number]  D:  05/22/2019 15:58  T:  05/22/2019 16:05  JOB #:  [id number]

## 2019-05-22 NOTE — PROGRESS NOTES
Pt intubated by ER doctor with 7.5 ET tube taped 24 at the lip. Placed on portable vent 450, AC 14 100% and PEEP 5.

## 2019-05-22 NOTE — H&P
Medicine History and Physical    Patient: Wesley Rahman   Age:  79 y.o.      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    Acute respiratory failure ([facility] J96.00    Hypocalcemia E83.51    Hypokalemia E87.6    Aspiration pneumonia ([facility] J69.0         Critical Care Note    Assessment and Plan by system:    CVS:BP stable, not on pressors, with unresponsiveness will place order for echo, continuous cardiac monitoring, fluid support-was hypotensive on arrival to ER but has improved after fluid resuscitation, consider sepsis, start protocol, and check lactate, place order for pressor support if systolic BP falls below 90    Resp:intubated, vent protocol, pulm consult, mouth care, OG tube    GI:stress ulcer proph-IV PPI    Neuro:seizures -reviewed home meds brought in, uncertain of compliance-kepppra IV, stat EEG, head MRI when medically stable, initial head CT no bleed or stroke-possibly todds paralysis on left side, but ER staff notes now he is moving left side when he was not on arrival  Elevated ammonia needs repeat and also may be due to seizures    :perico for I/O management and monitoring    Endo:BG checks ACHS, check A1C    Heme:no signs for bleeding    ID:IV abx for poss asp pneumonia, sepsis, clinda plus zosyn, placed order for lactate and blood cultures    F/E/N:severe low calcium and potassium-4 runs IV KCL ordered, Pulm MD ordered Ca gluconate, protocols enacted, daily BMP ordered and repeat at 6 pm tonight    DVT proph:heparin SQ, SCD's    UDS pos for THC of note    Guarded prognosis and condition, admit to ICU      34 minutes of critical care time spent in the direct evaluation and treatment of this high risk patient.  The reason for providing this level of medical care for this critically ill patient was due a critical illness that impaired one or more vital organ systems such that there was a high probability of imminent or life threatening deterioration in the patients condition. This care involved high complexity decision making to assess, manipulate, and support vital system functions, to treat this degreee vital organ system failure and to prevent further life threatening deterioration of the patients condition.

## 2019-05-22 NOTE — PROGRESS NOTES
Reason for Admission:   Witnessed seizure activity                   RRAT Score:   low                  Plan for utilizing home health:   TBD                       Current Advanced Directive/Advance Care Plan: not on chart recommend palliative care for care decisions     Likelihood of Readmission:  no                         Transition of Care Plan:   Chart reviewed noted from ED note  Pt was found by eveline having activity and also witnessed by ENT, intubated in ED pt will be admitted to ICU for level of care, cm will follow up on case tomorrow when more stable.

## 2019-05-22 NOTE — PROGRESS NOTES
Zosyn (Piperacillin/Tazobactam) Extended Infusion    Summer Castelan, a 79 y.o. yo male, has been converted to an extended infusion of Zosyn while in the intensive care unit. A loading dose of 3.375 gm was given over 30 minutes. Extended infusions will begin 4 hours after the initial dose if CrCl  >/= 20 ml/min or 8 hours after the initial dose if CrCl < 20 ml/min. Extended infusions will run over 4 hours (240 minutes). Recent Labs     05/22/19  1339   CREA 0.77     Ht Readings from Last 1 Encounters:   No data found for Ht       Wt Readings from Last 1 Encounters:   05/22/19 83.8 kg (184 lb 11.9 oz)         CrCl : Creatinine clearance cannot be calculated (Unknown ideal weight.)    Renal adjustment of extended infusion of Zosyn  3.375 or 4.5 gm every 8 hours for CrCl >/= 20 ml/min  3.375 or 4.5 gm every 12 hours for CrCl < 20 ml/min, intermittent HD or PD    HANNA Echavarria, [address] [facility] 19-Mar-2019 20:46

## 2019-05-22 NOTE — CONSULTS
[facility]                  Pulmonary, Critical Care, and Sleep Medicine     Name: Miranda Mcnally MRN: [id number]   : 1950 Hospital: [facility] [place]    Date: 2019        [facility] Note                                              Consult requesting physician: Dr. Samantha Gambino  Reason for Consult: ventilator management, seizure    Subjective/History of Present Illness:     Patient is a 79 y.o. male with PMHx significant for epilepsy, found in active seizure by landlord, seizure in ambulance, received ativan, hypoxic due to apnea in ER, intubated in ER. CT head negative. Labile BP.     2019   Recently moved to [employer] found him seizing (his apartment door was open)  Intubated in ER with hypoxia despite of 100% NRB  Left paralysis   Initially hypertensive and then hypotensive, before intubation  Intubated with kevin and etomidate. During intubation, noted to have clear secretions above vocal cord. Soon after intubation, ventilating well and no hypoxia. Now   CT head negative. Ammonia 122  Drug screen pending. Dr. Candy Swann called. Received keppra. Other labs just reported, low K and Ca, replacement ordered and d/w RN in ER to replace stat. D/w Dr. Samantha Gambino who will place central line in ER  Lots of endotracheal secretions, clear. ETT high on CXR, ordered to be pushed and d/w ER RN. Per RN, his left flaccid paralysis has improved and he was trying to grab the ETT by left hand. Moving all 4 limbs. Due to that he is now sedated with propofol. ROS limited as he is intubated. No family at bedside. The patient is critically ill and can not provide additional history due to Ventilated. History taken from patient, EMR     Review of Systems:  Review of systems not obtained due to patient factors. Not on File   No past medical history on file. No past surgical history on file.    Social History     Tobacco Use    Smoking status: Not on file   Substance Use Topics    Alcohol use: Not on file      No family history on file. Prior to Admission medications    Not on File     Current Facility-Administered Medications   Medication Dose Route Frequency    propofol (DIPRIVAN) infusion  0-50 mcg/kg/min IntraVENous CONTINUOUS    0.9% sodium chloride infusion  125 mL/hr IntraVENous CONTINUOUS    potassium chloride 10 mEq in 100 ml IVPB  10 mEq IntraVENous Q1H    piperacillin-tazobactam (ZOSYN) 3.375 g in 0.9% sodium chloride (MBP/ADV) 100 mL MBP  3.375 g IntraVENous Q6H    calcium gluconate 2 g in 0.9% sodium chloride 100 mL IVPB  2 g IntraVENous ONCE    [START ON 2019] pantoprazole (PROTONIX) 40 mg in sodium chloride 0.9% 10 mL injection  40 mg IntraVENous DAILY         Objective:   Vital Signs:    Visit Vitals  BP (!) 89/56   Pulse 80   Temp 97.2 °F (36.2 °C)   Resp 24   Wt 83.8 kg (184 lb 11.9 oz)   SpO2 99%       O2 Device: Room air       Temp (24hrs), Av.4 °F (36.9 °C), Min:97.2 °F (36.2 °C), Max:99.5 °F (37.5 °C)       Intake/Output:   Last shift:       0701 -  1900  In: 3100 [I.V.:3100]  Out: -     Last 3 shifts: No intake/output data recorded. Intake/Output Summary (Last 24 hours) at 2019 1532  Last data filed at 2019 1529  Gross per 24 hour   Intake 3100 ml   Output    Net 3100 ml       Last 3 Recorded Weights in this Encounter    19 1323   Weight: 83.8 kg (184 lb 11.9 oz)       Ventilator Settings:  Mode Rate Tidal Volume Pressure FiO2 PEEP   Assist control   450 ml    100 % 5 cm H20     Peak airway pressure: 18 cm H2O    Plateau pressure:     Tidal volume:    Minute ventilation: 6.4 l/min   SPO2 98       Physical Exam:     General/Neurology: intubated sedated. Head:   Normocephalic, without obvious abnormality, atraumatic. Eye:   EOM intact, pupils not dilated, no scleral icterus, no pallor, no cyanosis. Throat:  ETT in place. Neck:   Supple, symmetric. No lymphadenopathy.  Trachea midline  Lung: Moderate air entry bilateral equal. B/l scattered rhonchi/rales +. No wheezing. No stridors. Heart:   Regular rate & rhythm. S1 S2 present. No murmur. No JVD. Abdomen:  Soft. NT. ND. +BS. No masses. Extremities:  No pedal edema. No cyanosis. No clubbing. Pulses: 2+ and symmetric in DP. Capillary refill: normal  Lymphatic:  No cervical or supraclavicular palpable lymphadenopathy. Skin:   Color, texture, turgor normal. Left thumb dorsal aberration +      Data:       Recent Results (from the past 24 hour(s))   EKG, 12 LEAD, INITIAL    Collection Time: 05/22/19  1:24 PM   Result Value Ref Range    Ventricular Rate 120 BPM    Atrial Rate 120 BPM    P-R Interval 156 ms    QRS Duration 100 ms    Q-T Interval 300 ms    QTC Calculation (Bezet) 424 ms    Calculated P Axis 72 degrees    Calculated R Axis -90 degrees    Calculated T Axis 72 degrees    Diagnosis       Sinus tachycardia  Right superior axis deviation  Incomplete right bundle branch block  Possible Right ventricular hypertrophy  Inferior infarct , age undetermined  Abnormal ECG  No previous ECGs available  Confirmed by Jimmie Castellanos MD, -- ([id number]) on 5/22/2019 1:54:47 PM     CBC WITH AUTOMATED DIFF    Collection Time: 05/22/19  1:39 PM   Result Value Ref Range    WBC 9.3 4.6 - 13.2 K/uL    RBC 3.35 (L) 4.70 - 5.50 M/uL    HGB 10.0 (L) 13.0 - 16.0 g/dL    HCT 31.4 (L) 36.0 - 48.0 %    MCV 93.7 74.0 - 97.0 FL    MCH 29.9 24.0 - 34.0 PG    MCHC 31.8 31.0 - 37.0 g/dL    RDW 12.9 11.6 - 14.5 %    PLATELET 544 090 - 152 K/uL    MPV 9.3 9.2 - 11.8 FL    NEUTROPHILS 83 (H) 40 - 73 %    LYMPHOCYTES 14 (L) 21 - 52 %    MONOCYTES 2 (L) 3 - 10 %    EOSINOPHILS 1 0 - 5 %    BASOPHILS 0 0 - 2 %    ABS. NEUTROPHILS 7.7 1.8 - 8.0 K/UL    ABS. LYMPHOCYTES 1.3 0.9 - 3.6 K/UL    ABS. MONOCYTES 0.2 0.05 - 1.2 K/UL    ABS. EOSINOPHILS 0.1 0.0 - 0.4 K/UL    ABS.  BASOPHILS 0.0 0.0 - 0.1 K/UL    DF AUTOMATED     METABOLIC PANEL, COMPREHENSIVE    Collection Time: 05/22/19  1:39 PM   Result Value Ref Range    Sodium 148 (H) 136 - 145 mmol/L    Potassium 3.2 (L) 3.5 - 5.5 mmol/L    Chloride 121 (H) 100 - 108 mmol/L    CO2 11 (L) 21 - 32 mmol/L    Anion gap 16 3.0 - 18 mmol/L    Glucose 140 (H) 74 - 99 mg/dL    BUN 10 7.0 - 18 MG/DL    Creatinine 0.77 0.6 - 1.3 MG/DL    BUN/Creatinine ratio 13 12 - 20      GFR est AA >60 >60 ml/min/1.73m2    GFR est non-AA >60 >60 ml/min/1.73m2    Calcium 5.3 (LL) 8.5 - 10.1 MG/DL    Bilirubin, total 0.2 0.2 - 1.0 MG/DL    ALT (SGPT) 20 16 - 61 U/L    AST (SGOT) 26 15 - 37 U/L    Alk.  phosphatase 66 45 - 117 U/L    Protein, total 4.2 (L) 6.4 - 8.2 g/dL    Albumin 2.0 (L) 3.4 - 5.0 g/dL    Globulin 2.2 2.0 - 4.0 g/dL    A-G Ratio 0.9 0.8 - 1.7     MAGNESIUM    Collection Time: 05/22/19  1:39 PM   Result Value Ref Range    Magnesium 1.6 1.6 - 2.6 mg/dL   CARDIAC PANEL,(CK, CKMB & TROPONIN)    Collection Time: 05/22/19  1:39 PM   Result Value Ref Range    CK 92 39 - 308 U/L    CK - MB <1.0 <3.6 ng/ml    CK-MB Index  0.0 - 4.0 %     CALCULATION NOT PERFORMED WHEN RESULT IS BELOW LINEAR LIMIT    Troponin-I, QT 0.08 (H) 0.0 - 0.045 NG/ML   AMMONIA    Collection Time: 05/22/19  1:39 PM   Result Value Ref Range    Ammonia 122 (H) 11 - 32 UMOL/L   ETHYL ALCOHOL    Collection Time: 05/22/19  1:39 PM   Result Value Ref Range    ALCOHOL(ETHYL),SERUM <3 0 - 3 MG/DL   SALICYLATE    Collection Time: 05/22/19  1:39 PM   Result Value Ref Range    Salicylate level 2.8 2.8 - 20.0 MG/DL   ACETAMINOPHEN    Collection Time: 05/22/19  1:39 PM   Result Value Ref Range    Acetaminophen level 6 (L) 10.0 - 30.0 ug/mL   POC G3    Collection Time: 05/22/19  2:04 PM   Result Value Ref Range    Device: VENT      FIO2 (POC) 100 %    pH (POC) 7.127 (LL) 7.35 - 7.45      pCO2 (POC) 56.1 (H) 35.0 - 45.0 MMHG    pO2 (POC) 285 (H) 80 - 100 MMHG    HCO3 (POC) 18.6 (L) 22 - 26 MMOL/L    sO2 (POC) 100 (H) 92 - 97 %    Base deficit (POC) 11 mmol/L    Mode ASSIST CONTROL      Tidal volume 450 ml    Set Rate 14 bpm    PEEP/CPAP (POC) 5.0 cmH2O    Allens test (POC) YES      Inspiratory Time 1.0 sec    Total resp. rate 14      Site RIGHT RADIAL      Patient temp.  98.6      Specimen type (POC) ARTERIAL      Performed by Samina Tapia, URINE    Collection Time: 05/22/19  2:27 PM   Result Value Ref Range    BENZODIAZEPINES NEGATIVE  NEG      BARBITURATES NEGATIVE  NEG      THC (TH-CANNABINOL) POSITIVE (A) NEG      OPIATES NEGATIVE  NEG      PCP(PHENCYCLIDINE) NEGATIVE  NEG      COCAINE NEGATIVE  NEG      AMPHETAMINES NEGATIVE  NEG      METHADONE NEGATIVE  NEG      HDSCOM (NOTE)    URINALYSIS W/ RFLX MICROSCOPIC    Collection Time: 05/22/19  2:27 PM   Result Value Ref Range    Color YELLOW      Appearance CLEAR      Specific gravity 1.018 1.005 - 1.030      pH (UA) 5.5 5.0 - 8.0      Protein 300 (A) NEG mg/dL    Glucose NEGATIVE  NEG mg/dL    Ketone NEGATIVE  NEG mg/dL    Bilirubin NEGATIVE  NEG      Blood MODERATE (A) NEG      Urobilinogen 0.2 0.2 - 1.0 EU/dL    Nitrites NEGATIVE  NEG      Leukocyte Esterase NEGATIVE  NEG     URINE MICROSCOPIC ONLY    Collection Time: 05/22/19  2:27 PM   Result Value Ref Range    WBC 0 to 1 0 - 5 /hpf    RBC 0 to 3 0 - 5 /hpf    Epithelial cells FEW 0 - 5 /lpf    Bacteria 1+ (A) NEG /hpf    Mucus 2+ (A) NEG /lpf   EKG, 12 LEAD, SUBSEQUENT    Collection Time: 05/22/19  2:50 PM   Result Value Ref Range    Ventricular Rate 100 BPM    Atrial Rate 100 BPM    P-R Interval 206 ms    QRS Duration 102 ms    Q-T Interval 356 ms    QTC Calculation (Bezet) 459 ms    Calculated P Axis 77 degrees    Calculated R Axis 15 degrees    Calculated T Axis 64 degrees    Diagnosis       Normal sinus rhythm  Incomplete right bundle branch block  Borderline ECG  When compared with ECG of 22-MAY-2019 13:24,  QRS axis shifted right  Criteria for Inferior infarct are no longer present           Chemistry Recent Labs     05/22/19  1339   *   *   K 3.2*   * CO2 11*   BUN 10   CREA 0.77   CA 5.3*   MG 1.6   AGAP 16   BUCR 13   AP 66   TP 4.2*   ALB 2.0*   GLOB 2.2   AGRAT 0.9        Lactic Acid No results found for: LAC  No results for input(s): LAC in the last 72 hours. Liver Enzymes Protein, total   Date Value Ref Range Status   05/22/2019 4.2 (L) 6.4 - 8.2 g/dL Final     Albumin   Date Value Ref Range Status   05/22/2019 2.0 (L) 3.4 - 5.0 g/dL Final     Globulin   Date Value Ref Range Status   05/22/2019 2.2 2.0 - 4.0 g/dL Final     A-G Ratio   Date Value Ref Range Status   05/22/2019 0.9 0.8 - 1.7   Final     AST (SGOT)   Date Value Ref Range Status   05/22/2019 26 15 - 37 U/L Final     Alk. phosphatase   Date Value Ref Range Status   05/22/2019 66 45 - 117 U/L Final     Recent Labs     05/22/19  1339   TP 4.2*   ALB 2.0*   GLOB 2.2   AGRAT 0.9   SGOT 26   AP 66        CBC w/Diff Recent Labs     05/22/19  1339   WBC 9.3   RBC 3.35*   HGB 10.0*   HCT 31.4*      GRANS 83*   LYMPH 14*   EOS 1        Cardiac Enzymes Lab Results   Component Value Date/Time    CPK 92 05/22/2019 01:39 PM    CKMB <1.0 05/22/2019 01:39 PM    CKND1  05/22/2019 01:39 PM     CALCULATION NOT PERFORMED WHEN RESULT IS BELOW LINEAR LIMIT    TROIQ 0.08 (H) 05/22/2019 01:39 PM        BNP No results found for: BNP, BNPP, XBNPT     Coagulation No results for input(s): PTP, INR, APTT in the last 72 hours.     No lab exists for component: INREXT, INREXT      Thyroid  No results found for: T4, T3U, TSH, TSHEXT, TSHEXT    No results found for: T4     Urinalysis Lab Results   Component Value Date/Time    Color YELLOW 05/22/2019 02:27 PM    Appearance CLEAR 05/22/2019 02:27 PM    Specific gravity 1.018 05/22/2019 02:27 PM    pH (UA) 5.5 05/22/2019 02:27 PM    Protein 300 (A) 05/22/2019 02:27 PM    Glucose NEGATIVE  05/22/2019 02:27 PM    Ketone NEGATIVE  05/22/2019 02:27 PM    Bilirubin NEGATIVE  05/22/2019 02:27 PM    Urobilinogen 0.2 05/22/2019 02:27 PM    Nitrites NEGATIVE  05/22/2019 02:27 PM Leukocyte Esterase NEGATIVE  05/22/2019 02:27 PM    Epithelial cells FEW 05/22/2019 02:27 PM    Bacteria 1+ (A) 05/22/2019 02:27 PM    WBC 0 to 1 05/22/2019 02:27 PM    RBC 0 to 3 05/22/2019 02:27 PM        Micro  No results for input(s): SDES, CULT in the last 72 hours. No results for input(s): CULT in the last 72 hours. ABG Recent Labs     05/22/19  1404   PHI 7.127*   PCO2I 56.1*   PO2I 285*   HCO3I 18.6*   FIO2I 100        PFT       Ultrasound       LE Doppler       ECHO       CT (Most Recent) (CT chest reviewed by me) Results from [place] encounter on 05/22/19   CT HEAD WO CONT    Narrative EXAM: CT head    INDICATION: Seizure, patient unresponsive. COMPARISON: None. TECHNIQUE: Axial CT imaging of the head was performed without intravenous  contrast.    One or more dose reduction techniques were used on this CT: automated exposure  control, adjustment of the mAs and/or kVp according to patient size, and  iterative reconstruction techniques. The specific techniques used on this CT  exam have been documented in the patient's electronic medical record. Digital  Imaging and Communications in Medicine (DICOM) format image data are available  to nonaffiliated external healthcare facilities or entities on a secure, media  free, reciprocally searchable basis with patient authorization for at least a  12-month period after this study. _______________    FINDINGS:    BRAIN AND POSTERIOR FOSSA: Cortical sulci volume is within normal limits for  patient age. The ventricular size and configuration is normal. Physiologic  bilateral basal ganglia calcifications are present. There is no intracranial  hemorrhage, mass effect, or midline shift. The gray-white matter differentiation  is within normal limits. Periventricular white matter low-attenuation is  present. EXTRA-AXIAL SPACES AND MENINGES: There are no abnormal extra-axial fluid  collections. CALVARIUM: Intact.     SINUSES: Clear.    OTHER: None.    _______________      Impression 1. No acute intracranial abnormality. 2. Mild periventricular white matter low-attenuation; nonspecific finding  favored to reflect sequela of chronic ischemic microvascular change. CRITICAL RESULT:  CODE S results called to Dr. Court Ge in the emergency room  prior to dictation at 1425 hours on 5/22/2019            XR (Most Recent). CXR  reviewed by me and compared with previous CXR No results found for this or any previous visit. IMPRESSION:   · Recurrent seizure, with hx of epilepsy (home regimen keppra and dilantin)  · Respiratory failure / apnea, intubated in ER 5/22/19  · Possible aspiration pneumonia vs pneumonitis. · Severe hypocalcemia  · Hypokalemia   · Hypernatremia   · Elevated ammonia likely due to seizure  · Mildly abnormal troponin likely due to seizure  · THC positive   · Anemia     · Code status: full      RECOMMENDATIONS:   Respiratory: ventilator orderset and protocol. ABG pending. Follow cxr report. Ventilator bundle & Sedation protocol followed. Daily sedation holiday, assessment for readiness for SBT and then re-titrate if required. Chlorhexidine mouth washes. Keep SPO2 >=92%. HOB 30 degree elevation all the time. Aggressive pulmonary toileting. Aspiration precautions. Incentive spirometry. CVS: BP labile, initially hypertensive and then hypo followed by hypertensive. Central line to be placed in ER by Dr. Perico Vivas. Repeat cardiac panel. If abnormal trop worsening, then cardiology consult and echo. ID: [email or web address] for possible aspiration. Get endotracheal aspirate for culture. Blood cx. Follow cultures. Deescalate antibiotic when appropriate. Hematology/Oncology: mild anemia, no active external bleeding  Renal: monitor renal function. Follow Na. GI/: no acute issues  Endocrine: Monitor BS. Neurology: keppra given in ER. Pt is on keppra and dilantin at home. CT head nil acute.  Left paralysis improved per RN before sedated with propofol started in ER. Dr. Nicolette Paez on board. Pain/Sedation: sedation protocol. Skin/Wound: no acute issues  Electrolytes: Replace electrolytes per ICU electrolyte replacement protocol. Replace Ca and K stat. IVF: NS  Nutrition: NPO for now  Prophylaxis: DVT Prophylaxis: heparin. GI Prophylaxis: protoni. Restraints: Wrist soft restraints for patient interfering with medical therapy/management and patient safety. Lines/Tubes: PIV  ETT: 5/22/19  OGT:   Miller: 5/22/19 (Medically necessary for strict input/output monitoring in critically ill patient, will remove it when not needed. Miller bundle followed). Will defer respective systems problem management to primary and other respective consultant and follow patient in ICU with primary and other medical team.  Further recommendations will be based on the patient's response to recommended treatment and results of the investigation ordered. Quality Care: PPI, DVT prophylaxis, HOB elevated, Infection control all reviewed and addressed. Care of plan d/w RN, RT, Dr. Mary Ramos, Dr. Deb Shearer. No family available. High complexity decision making was performed during the evaluation of this patient at high risk for decompensation with multiple organ involvement. Total critical care time spent rendering care exclusive of procedures: 46 minutes.          Porsche Valdovinos MD  5/22/2019

## 2019-05-22 NOTE — ED NOTES
Upon arrival pt was very hard to awake. Pt only responded too painful stimulus on the right side. Left side arm and leg were flaccid. Pt was unable to speak to answer any questions. Pt was gargling on think white secretions and unable to protect airway.

## 2019-05-22 NOTE — PROGRESS NOTES
1640 Verbal report received from Sarkis Mcfarlane RN. Report consisted of patients Situation, Background, Assessment and   Recommendations(SBAR). 1711 Patient arrived via wheelchair on monitor. Skin assessment completed, and levophed and propofol rate verified with Sarkis Mcfarlane RN.    1800 Assessment complete    1800 Admission assessment not completed. Patient intubated and sedated, no family present.

## 2019-05-22 NOTE — PROGRESS NOTES
1905 - Bedside report obtained. Assumed care of patient. Current infusion rates verified with FREYA Magdaleno RN.     4822 - Shift assessment completed at this time. [address] obtained by phlebotomist.     2000 - Patients significant other at bedside provided current prescription medication bottles. PTA med list updated in system. 2030 - EEG tech at bedside. 2050 - Cardiac enzyme results back. Hospitalist paged. 2105 - Paged returned. Dr Yassine Alford made aware of increase in cardiac panel, increase in creatinine. Order placed by physician for ECHO. Physician also made aware of updated PTA medication list. Physician placed inpatient orders to match home list (dosage and frequency) of Dilantin and Keppra. 2300 - Reassessment completed at this time. No changes noted. 2330 - .     0200 - CHG bath performed. Dressing to right IJ changed due to drainage. 0300 - BP 83/57. Levophed gtt increased. 0315 - BP 91/64. Will continue to monitor. 0430 - Reassessment completed. 0600 - /99.     0616 - Levophed gtt decreased at this time. 0725 - Bedside and Verbal shift change report given to Kristina Larkin RN (oncoming nurse) by Chidi Cast RN (offgoing nurse). Report included the following information SBAR, Kardex, ED Summary, Procedure Summary, Intake/Output, MAR, Recent Results, Med Rec Status and Cardiac Rhythm NSR: AV Block.

## 2019-05-23 ENCOUNTER — APPOINTMENT (OUTPATIENT)
Dept: NON INVASIVE DIAGNOSTICS | Age: 69
DRG: 100 | End: 2019-05-23
Attending: INTERNAL MEDICINE
Payer: MEDICARE

## 2019-05-23 ENCOUNTER — APPOINTMENT (OUTPATIENT)
Dept: NON INVASIVE DIAGNOSTICS | Age: 69
DRG: 100 | End: 2019-05-23
Attending: HOSPITALIST
Payer: MEDICARE

## 2019-05-23 ENCOUNTER — APPOINTMENT (OUTPATIENT)
Dept: VASCULAR SURGERY | Age: 69
DRG: 100 | End: 2019-05-23
Attending: INTERNAL MEDICINE
Payer: MEDICARE

## 2019-05-23 ENCOUNTER — APPOINTMENT (OUTPATIENT)
Dept: NUCLEAR MEDICINE | Age: 69
DRG: 100 | End: 2019-05-23
Attending: INTERNAL MEDICINE
Payer: MEDICARE

## 2019-05-23 ENCOUNTER — APPOINTMENT (OUTPATIENT)
Dept: GENERAL RADIOLOGY | Age: 69
DRG: 100 | End: 2019-05-23
Attending: INTERNAL MEDICINE
Payer: MEDICARE

## 2019-05-23 LAB
ALBUMIN SERPL-MCNC: 2.8 G/DL (ref 3.4–5)
ALBUMIN/GLOB SERPL: 1 {RATIO} (ref 0.8–1.7)
ALP SERPL-CCNC: 82 U/L (ref 45–117)
ALT SERPL-CCNC: 42 U/L (ref 16–61)
AMMONIA PLAS-SCNC: 28 UMOL/L (ref 11–32)
ANION GAP SERPL CALC-SCNC: 7 MMOL/L (ref 3–18)
APTT PPP: 58.1 SEC (ref 23–36.4)
APTT PPP: >180 SEC (ref 23–36.4)
ARTERIAL PATENCY WRIST A: YES
AST SERPL-CCNC: 83 U/L (ref 15–37)
BASE DEFICIT BLD-SCNC: 6 MMOL/L
BASOPHILS # BLD: 0 K/UL (ref 0–0.1)
BASOPHILS NFR BLD: 0 % (ref 0–2)
BDY SITE: ABNORMAL
BILIRUB SERPL-MCNC: 0.4 MG/DL (ref 0.2–1)
BODY TEMPERATURE: 97.8
BUN SERPL-MCNC: 17 MG/DL (ref 7–18)
BUN/CREAT SERPL: 10 (ref 12–20)
CALCIUM SERPL-MCNC: 7.9 MG/DL (ref 8.5–10.1)
CHLORIDE SERPL-SCNC: 114 MMOL/L (ref 100–108)
CK MB CFR SERPL CALC: 0.3 % (ref 0–4)
CK MB CFR SERPL CALC: 0.7 % (ref 0–4)
CK MB SERPL-MCNC: 11.7 NG/ML (ref 5–25)
CK MB SERPL-MCNC: 9.4 NG/ML (ref 5–25)
CK SERPL-CCNC: 1608 U/L (ref 39–308)
CK SERPL-CCNC: 3280 U/L (ref 39–308)
CO2 SERPL-SCNC: 24 MMOL/L (ref 21–32)
CREAT SERPL-MCNC: 1.62 MG/DL (ref 0.6–1.3)
DIFFERENTIAL METHOD BLD: ABNORMAL
ECHO AV AREA PEAK VELOCITY: 3 CM2
ECHO AV AREA VTI: 3 CM2
ECHO AV AREA/BSA PEAK VELOCITY: 1.4 CM2/M2
ECHO AV AREA/BSA VTI: 1.4 CM2/M2
ECHO AV MEAN GRADIENT: 1.5 MMHG
ECHO AV PEAK GRADIENT: 2.5 MMHG
ECHO AV PEAK VELOCITY: 79.72 CM/S
ECHO AV VTI: 14.77 CM
ECHO IVC PROX: 2.06 CM
ECHO LA MAJOR AXIS: 4.2 CM
ECHO LA VOL 2C: 68.69 ML (ref 18–58)
ECHO LA VOL 4C: 49.12 ML (ref 18–58)
ECHO LA VOLUME INDEX A2C: 31.52 ML/M2 (ref 16–28)
ECHO LA VOLUME INDEX A4C: 22.54 ML/M2 (ref 16–28)
ECHO LV E' LATERAL VELOCITY: 11 CM/S
ECHO LV E' SEPTAL VELOCITY: 7 CM/S
ECHO LV EDV A2C: 52.6 ML
ECHO LV EDV A4C: 78.8 ML
ECHO LV EDV BP: 64.2 ML (ref 67–155)
ECHO LV EDV INDEX A4C: 36.2 ML/M2
ECHO LV EDV INDEX BP: 29.5 ML/M2
ECHO LV EDV NDEX A2C: 24.1 ML/M2
ECHO LV EJECTION FRACTION A2C: 44 %
ECHO LV EJECTION FRACTION A4C: 47 %
ECHO LV EJECTION FRACTION BIPLANE: 40.4 % (ref 55–100)
ECHO LV ESV A2C: 29.4 ML
ECHO LV ESV A4C: 41.8 ML
ECHO LV ESV BP: 38.2 ML (ref 22–58)
ECHO LV ESV INDEX A2C: 13.5 ML/M2
ECHO LV ESV INDEX A4C: 19.2 ML/M2
ECHO LV ESV INDEX BP: 17.5 ML/M2
ECHO LV INTERNAL DIMENSION DIASTOLIC: 4.23 CM (ref 4.2–5.9)
ECHO LV INTERNAL DIMENSION SYSTOLIC: 2.72 CM
ECHO LV IVSD: 0.86 CM (ref 0.6–1)
ECHO LV MASS 2D: 147 G (ref 88–224)
ECHO LV MASS INDEX 2D: 67.5 G/M2 (ref 49–115)
ECHO LV POSTERIOR WALL DIASTOLIC: 1.04 CM (ref 0.6–1)
ECHO LVOT DIAM: 1.97 CM
ECHO LVOT PEAK GRADIENT: 2.5 MMHG
ECHO LVOT PEAK VELOCITY: 78.28 CM/S
ECHO LVOT VTI: 14.74 CM
ECHO MV A VELOCITY: 43.31 CM/S
ECHO MV AREA PHT: 2.8 CM2
ECHO MV E DECELERATION TIME (DT): 270.7 MS
ECHO MV E VELOCITY: 76.24 CM/S
ECHO MV E/A RATIO: 1.76
ECHO MV E/E' LATERAL: 6.93
ECHO MV E/E' RATIO (AVERAGED): 8.91
ECHO MV E/E' SEPTAL: 10.89
ECHO MV PRESSURE HALF TIME (PHT): 78.5 MS
ECHO RV INTERNAL DIMENSION: 4.5 CM
ECHO TRICUSPID ANNULAR PEAK SYSTOLIC VELOCITY: 2 CM/S
EOSINOPHIL # BLD: 0 K/UL (ref 0–0.4)
EOSINOPHIL NFR BLD: 0 % (ref 0–5)
ERYTHROCYTE [DISTWIDTH] IN BLOOD BY AUTOMATED COUNT: 13.2 % (ref 11.6–14.5)
ERYTHROCYTE [DISTWIDTH] IN BLOOD BY AUTOMATED COUNT: 13.2 % (ref 11.6–14.5)
GAS FLOW.O2 O2 DELIVERY SYS: ABNORMAL L/MIN
GAS FLOW.O2 SETTING OXYMISER: 18 BPM
GLOBULIN SER CALC-MCNC: 2.8 G/DL (ref 2–4)
GLUCOSE BLD STRIP.AUTO-MCNC: 101 MG/DL (ref 70–110)
GLUCOSE BLD STRIP.AUTO-MCNC: 117 MG/DL (ref 70–110)
GLUCOSE SERPL-MCNC: 107 MG/DL (ref 74–99)
HCO3 BLD-SCNC: 20.5 MMOL/L (ref 22–26)
HCT VFR BLD AUTO: 35.8 % (ref 36–48)
HCT VFR BLD AUTO: 36.6 % (ref 36–48)
HGB BLD-MCNC: 11.7 G/DL (ref 13–16)
HGB BLD-MCNC: 11.8 G/DL (ref 13–16)
INSPIRATION.DURATION SETTING TIME VENT: 1 SEC
LYMPHOCYTES # BLD: 0.9 K/UL (ref 0.9–3.6)
LYMPHOCYTES NFR BLD: 7 % (ref 21–52)
MCH RBC QN AUTO: 29.7 PG (ref 24–34)
MCH RBC QN AUTO: 30 PG (ref 24–34)
MCHC RBC AUTO-ENTMCNC: 32.2 G/DL (ref 31–37)
MCHC RBC AUTO-ENTMCNC: 32.7 G/DL (ref 31–37)
MCV RBC AUTO: 91.8 FL (ref 74–97)
MCV RBC AUTO: 92.2 FL (ref 74–97)
MONOCYTES # BLD: 1 K/UL (ref 0.05–1.2)
MONOCYTES NFR BLD: 8 % (ref 3–10)
NEUTS SEG # BLD: 10.5 K/UL (ref 1.8–8)
NEUTS SEG NFR BLD: 85 % (ref 40–73)
O2/TOTAL GAS SETTING VFR VENT: 40 %
PCO2 BLD: 40.4 MMHG (ref 35–45)
PEEP RESPIRATORY: 5 CMH2O
PH BLD: 7.31 [PH] (ref 7.35–7.45)
PHENYTOIN SERPL-MCNC: <0.4 UG/ML (ref 10–20)
PLATELET # BLD AUTO: 136 K/UL (ref 135–420)
PLATELET # BLD AUTO: 155 K/UL (ref 135–420)
PMV BLD AUTO: 9.2 FL (ref 9.2–11.8)
PMV BLD AUTO: 9.4 FL (ref 9.2–11.8)
PO2 BLD: 156 MMHG (ref 80–100)
POTASSIUM SERPL-SCNC: 4.4 MMOL/L (ref 3.5–5.5)
PROT SERPL-MCNC: 5.6 G/DL (ref 6.4–8.2)
RBC # BLD AUTO: 3.9 M/UL (ref 4.7–5.5)
RBC # BLD AUTO: 3.97 M/UL (ref 4.7–5.5)
SAO2 % BLD: 99 % (ref 92–97)
SERVICE CMNT-IMP: ABNORMAL
SODIUM SERPL-SCNC: 145 MMOL/L (ref 136–145)
SPECIMEN TYPE: ABNORMAL
TOTAL RESP. RATE, ITRR: 18
TROPONIN I SERPL-MCNC: 0.2 NG/ML (ref 0–0.04)
TROPONIN I SERPL-MCNC: 0.38 NG/ML (ref 0–0.04)
VENTILATION MODE VENT: ABNORMAL
VOLUME CONTROL PLUS IVLCP: YES
VT SETTING VENT: 450 ML
WBC # BLD AUTO: 12.4 K/UL (ref 4.6–13.2)
WBC # BLD AUTO: 12.5 K/UL (ref 4.6–13.2)

## 2019-05-23 PROCEDURE — 82962 GLUCOSE BLOOD TEST: CPT

## 2019-05-23 PROCEDURE — 80053 COMPREHEN METABOLIC PANEL: CPT

## 2019-05-23 PROCEDURE — 77010033678 HC OXYGEN DAILY

## 2019-05-23 PROCEDURE — 74011250636 HC RX REV CODE- 250/636: Performed by: PSYCHIATRY & NEUROLOGY

## 2019-05-23 PROCEDURE — 71045 X-RAY EXAM CHEST 1 VIEW: CPT

## 2019-05-23 PROCEDURE — 85027 COMPLETE CBC AUTOMATED: CPT

## 2019-05-23 PROCEDURE — 93970 EXTREMITY STUDY: CPT

## 2019-05-23 PROCEDURE — 82140 ASSAY OF AMMONIA: CPT

## 2019-05-23 PROCEDURE — 82803 BLOOD GASES ANY COMBINATION: CPT

## 2019-05-23 PROCEDURE — 74011000250 HC RX REV CODE- 250: Performed by: HOSPITALIST

## 2019-05-23 PROCEDURE — 94003 VENT MGMT INPAT SUBQ DAY: CPT

## 2019-05-23 PROCEDURE — 74011250637 HC RX REV CODE- 250/637: Performed by: INTERNAL MEDICINE

## 2019-05-23 PROCEDURE — 74011000258 HC RX REV CODE- 258: Performed by: INTERNAL MEDICINE

## 2019-05-23 PROCEDURE — 93005 ELECTROCARDIOGRAM TRACING: CPT

## 2019-05-23 PROCEDURE — 74011250636 HC RX REV CODE- 250/636: Performed by: INTERNAL MEDICINE

## 2019-05-23 PROCEDURE — 85025 COMPLETE CBC W/AUTO DIFF WBC: CPT

## 2019-05-23 PROCEDURE — 74011000258 HC RX REV CODE- 258: Performed by: HOSPITALIST

## 2019-05-23 PROCEDURE — 36600 WITHDRAWAL OF ARTERIAL BLOOD: CPT

## 2019-05-23 PROCEDURE — 74011250636 HC RX REV CODE- 250/636: Performed by: EMERGENCY MEDICINE

## 2019-05-23 PROCEDURE — 85730 THROMBOPLASTIN TIME PARTIAL: CPT

## 2019-05-23 PROCEDURE — 74011250636 HC RX REV CODE- 250/636: Performed by: HOSPITALIST

## 2019-05-23 PROCEDURE — 65610000006 HC RM INTENSIVE CARE

## 2019-05-23 PROCEDURE — C8929 TTE W OR WO FOL WCON,DOPPLER: HCPCS

## 2019-05-23 PROCEDURE — C9113 INJ PANTOPRAZOLE SODIUM, VIA: HCPCS | Performed by: HOSPITALIST

## 2019-05-23 PROCEDURE — 77030037877 HC DRSG MEPILEX >48IN BORD MOLN -A

## 2019-05-23 PROCEDURE — 77030037875 HC DRSG MEPILEX <16IN BORD MOLN -A

## 2019-05-23 RX ORDER — OXYCODONE AND ACETAMINOPHEN 5; 325 MG/1; MG/1
1-2 TABLET ORAL
Status: DISCONTINUED | OUTPATIENT
Start: 2019-05-23 | End: 2019-05-26 | Stop reason: HOSPADM

## 2019-05-23 RX ORDER — HEPARIN SODIUM 1000 [USP'U]/ML
80 INJECTION, SOLUTION INTRAVENOUS; SUBCUTANEOUS ONCE
Status: COMPLETED | OUTPATIENT
Start: 2019-05-23 | End: 2019-05-23

## 2019-05-23 RX ORDER — ACETAMINOPHEN 325 MG/1
650 TABLET ORAL
Status: DISCONTINUED | OUTPATIENT
Start: 2019-05-23 | End: 2019-05-26 | Stop reason: HOSPADM

## 2019-05-23 RX ORDER — HEPARIN SODIUM 10000 [USP'U]/100ML
18-36 INJECTION, SOLUTION INTRAVENOUS
Status: DISCONTINUED | OUTPATIENT
Start: 2019-05-23 | End: 2019-05-26

## 2019-05-23 RX ORDER — LEVETIRACETAM 10 MG/ML
1000 INJECTION INTRAVASCULAR EVERY 12 HOURS
Status: DISCONTINUED | OUTPATIENT
Start: 2019-05-23 | End: 2019-05-24

## 2019-05-23 RX ORDER — MORPHINE SULFATE 2 MG/ML
2 INJECTION, SOLUTION INTRAMUSCULAR; INTRAVENOUS
Status: DISCONTINUED | OUTPATIENT
Start: 2019-05-23 | End: 2019-05-26 | Stop reason: HOSPADM

## 2019-05-23 RX ADMIN — PIPERACILLIN SODIUM,TAZOBACTAM SODIUM 3.38 G: 3; .375 INJECTION, POWDER, FOR SOLUTION INTRAVENOUS at 20:41

## 2019-05-23 RX ADMIN — FOLIC ACID: 5 INJECTION, SOLUTION INTRAMUSCULAR; INTRAVENOUS; SUBCUTANEOUS at 09:51

## 2019-05-23 RX ADMIN — HEPARIN SODIUM 18 UNITS/KG/HR: 10000 INJECTION, SOLUTION INTRAVENOUS at 19:29

## 2019-05-23 RX ADMIN — CHLORHEXIDINE GLUCONATE 10 ML: 1.2 RINSE ORAL at 08:34

## 2019-05-23 RX ADMIN — CLINDAMYCIN PHOSPHATE 600 MG: 600 INJECTION, SOLUTION INTRAVENOUS at 08:38

## 2019-05-23 RX ADMIN — PROPOFOL 40 MCG/KG/MIN: 10 INJECTION, EMULSION INTRAVENOUS at 08:33

## 2019-05-23 RX ADMIN — HEPARIN SODIUM 18 UNITS/KG/HR: 10000 INJECTION, SOLUTION INTRAVENOUS at 14:13

## 2019-05-23 RX ADMIN — PIPERACILLIN SODIUM,TAZOBACTAM SODIUM 3.38 G: 3; .375 INJECTION, POWDER, FOR SOLUTION INTRAVENOUS at 13:11

## 2019-05-23 RX ADMIN — PROPOFOL 40 MCG/KG/MIN: 10 INJECTION, EMULSION INTRAVENOUS at 03:25

## 2019-05-23 RX ADMIN — OXYCODONE HYDROCHLORIDE AND ACETAMINOPHEN 1 TABLET: 5; 325 TABLET ORAL at 21:34

## 2019-05-23 RX ADMIN — HEPARIN SODIUM 6680 UNITS: 1000 INJECTION, SOLUTION INTRAVENOUS; SUBCUTANEOUS at 14:13

## 2019-05-23 RX ADMIN — SODIUM CHLORIDE 40 MG: 9 INJECTION, SOLUTION INTRAMUSCULAR; INTRAVENOUS; SUBCUTANEOUS at 08:38

## 2019-05-23 RX ADMIN — PERFLUTREN 1 ML: 6.52 INJECTION, SUSPENSION INTRAVENOUS at 10:45

## 2019-05-23 RX ADMIN — HEPARIN SODIUM 5000 UNITS: 5000 INJECTION INTRAVENOUS; SUBCUTANEOUS at 08:38

## 2019-05-23 RX ADMIN — DEXTROSE MONOHYDRATE AND SODIUM CHLORIDE 125 ML/HR: 5; .45 INJECTION, SOLUTION INTRAVENOUS at 13:11

## 2019-05-23 RX ADMIN — PIPERACILLIN SODIUM,TAZOBACTAM SODIUM 3.38 G: 3; .375 INJECTION, POWDER, FOR SOLUTION INTRAVENOUS at 03:25

## 2019-05-23 RX ADMIN — DEXTROSE MONOHYDRATE AND SODIUM CHLORIDE 125 ML/HR: 5; .45 INJECTION, SOLUTION INTRAVENOUS at 03:25

## 2019-05-23 RX ADMIN — LEVETIRACETAM 1500 MG: 15 INJECTION INTRAVENOUS at 10:16

## 2019-05-23 RX ADMIN — PHENYTOIN SODIUM 300 MG: 50 INJECTION INTRAMUSCULAR; INTRAVENOUS at 09:48

## 2019-05-23 RX ADMIN — LEVETIRACETAM 1000 MG: 10 INJECTION INTRAVENOUS at 21:39

## 2019-05-23 NOTE — PROGRESS NOTES
Cm outside of pt room, planning for extubation, cm will revisit pt at another time, cm will need to discuss veterans transfer form and d/c planning.

## 2019-05-23 NOTE — PROGRESS NOTES
[facility]                  Pulmonary, Critical Care, and Sleep Medicine     Name: Gilliland Baptiste MRN: [id number]   : 1950 Hospital: [facility] [place]    Date: 2019        PCCM Note                                              Consult requesting physician: Dr. Deb Shearer  Reason for Consult: ventilator management, seizure    Subjective/History of Present Illness:     Patient is a 79 y.o. male with PMHx significant for ? DVT, epilepsy, found in active seizure by landlord, seizure in ambulance, received ativan, hypoxic due to apnea in ER, intubated in ER. CT head negative. Labile BP. Hypotension requiring vasopressors. 2019   Remains in icu intubated  Hypotension required to be on levophed, now at 2  No fever  No leucocytosis  Minimal ett secretions. eeg generalized slowing  Dilantin level very low  Ammonia normalized  Wife at bedside, reported about hx of dvt recently. See a/p for detail. No other overnight issues. ROS limited as he is intubated. The patient is critically ill and can not provide additional history due to Ventilated. Review of Systems:  Review of systems not obtained due to patient factors. No Known Allergies   No past medical history on file. No past surgical history on file. Social History     Tobacco Use    Smoking status: Not on file   Substance Use Topics    Alcohol use: Not on file      No family history on file. Prior to Admission medications    Medication Sig Start Date End Date Taking? Authorizing Provider   levETIRAcetam (KEPPRA) 750 mg tablet Take 1,500 mg by mouth two (2) times a day. Yes Provider, Historical   phenytoin sodium extended (DILANTIN PO) Take 300 mg by mouth two (2) times a day. Yes Provider, Historical   lacosamide (VIMPAT) 100 mg tab tablet Take 100 mg by mouth two (2) times a day.    Yes Provider, Historical     Current Facility-Administered Medications   Medication Dose Route Frequency    propofol (DIPRIVAN) infusion  0-50 mcg/kg/min IntraVENous CONTINUOUS    pantoprazole (PROTONIX) 40 mg in sodium chloride 0.9% 10 mL injection  40 mg IntraVENous DAILY    chlorhexidine (PERIDEX) 0.12 % mouthwash 10 mL  10 mL Oral Q12H    heparin (porcine) injection 5,000 Units  5,000 Units SubCUTAneous Q8H    clindamycin (CLEOCIN) 600mg NS 50 mL IVPB (premix)  600 mg IntraVENous Q8H    NOREPINephrine (LEVOPHED) 8 mg in dextrose 5% 250 mL infusion  2-16 mcg/min IntraVENous TITRATE    0.9% sodium chloride 1,000 mL with mvi, adult no. 4 with vit K 10 mL, thiamine 959 mg, folic acid 1 mg infusion   IntraVENous Q24H    insulin lispro (HUMALOG) injection   SubCUTAneous Q6H    piperacillin-tazobactam (ZOSYN) 3.375 g in 0.9% sodium chloride (MBP/ADV) 100 mL MBP  3.375 g IntraVENous Q8H    dextrose 5 % - 0.45% NaCl infusion  125 mL/hr IntraVENous CONTINUOUS    phenytoin (DILANTIN) 300 mg in 0.9% sodium chloride 50 mL IVPB  300 mg IntraVENous BID    levETIRAcetam (KEPPRA) 1500 mg in saline (iso-osm) 100 ml IVPB  1,500 mg IntraVENous Q12H    potassium chloride in water 10 mEq/100 mL IVPB premix pgbk             Objective:   Vital Signs:    Visit Vitals  /78   Pulse 70   Temp 97.8 °F (36.6 °C)   Resp 18   Wt 83.8 kg (184 lb 11.9 oz)   SpO2 100%       O2 Device: Endotracheal tube       Temp (24hrs), Av °F (36.7 °C), Min:97 °F (36.1 °C), Max:99.5 °F (37.5 °C)       Intake/Output:   Last shift:      No intake/output data recorded.     Last 3 shifts:  1901 -  0700  In: 5656.3 [I.V.:5656.3]  Out: 3375 [Urine:3375]      Intake/Output Summary (Last 24 hours) at 2019 0953  Last data filed at 2019 0643  Gross per 24 hour   Intake 5656.33 ml   Output 3375 ml   Net 2281.33 ml       Last 3 Recorded Weights in this Encounter    19 1323   Weight: 83.8 kg (184 lb 11.9 oz)       Ventilator Settings:  Mode Rate Tidal Volume Pressure FiO2 PEEP   Assist control   450 ml    30 % 5 cm H20 Peak airway pressure: 31 cm H2O    Plateau pressure:     Tidal volume:    Minute ventilation: 7.7 l/min   SPO2 97       Physical Exam:     General/Neurology: intubated sedated. Neuro exam limited. Head:   Normocephalic, without obvious abnormality, atraumatic. Eye:   EOM intact, pupils not dilated, no scleral icterus, no pallor, no cyanosis. Throat:  ETT, OGT in place. Neck:   Supple, symmetric. No lymphadenopathy. Trachea midline  Lung: Moderate air entry bilateral equal. No rhonchi. No wheezing. No stridors. Heart:   Regular rate & rhythm. S1 S2 present. No murmur. No JVD. Abdomen:  Soft. NT. ND. +BS. No masses. Extremities:  No pedal edema. No cyanosis. No clubbing. Pulses: 2+ and symmetric in DP. Capillary refill: normal  Lymphatic:  No cervical or supraclavicular palpable lymphadenopathy.    Skin:   Color, texture, turgor normal. Left thumb dorsal aberration +      Data:       Recent Results (from the past 24 hour(s))   EKG, 12 LEAD, INITIAL    Collection Time: 05/22/19  1:24 PM   Result Value Ref Range    Ventricular Rate 120 BPM    Atrial Rate 120 BPM    P-R Interval 156 ms    QRS Duration 100 ms    Q-T Interval 300 ms    QTC Calculation (Bezet) 424 ms    Calculated P Axis 72 degrees    Calculated R Axis -90 degrees    Calculated T Axis 72 degrees    Diagnosis       Sinus tachycardia  Right superior axis deviation  Incomplete right bundle branch block  Possible Right ventricular hypertrophy  Inferior infarct , age undetermined  Abnormal ECG  No previous ECGs available  Confirmed by Paul Ray MD, -- ([id number]) on 5/22/2019 1:54:47 PM     CBC WITH AUTOMATED DIFF    Collection Time: 05/22/19  1:39 PM   Result Value Ref Range    WBC 9.3 4.6 - 13.2 K/uL    RBC 3.35 (L) 4.70 - 5.50 M/uL    HGB 10.0 (L) 13.0 - 16.0 g/dL    HCT 31.4 (L) 36.0 - 48.0 %    MCV 93.7 74.0 - 97.0 FL    MCH 29.9 24.0 - 34.0 PG    MCHC 31.8 31.0 - 37.0 g/dL    RDW 12.9 11.6 - 14.5 %    PLATELET 373 639 - 450 K/uL    MPV 9.3 9.2 - 11.8 FL    NEUTROPHILS 83 (H) 40 - 73 %    LYMPHOCYTES 14 (L) 21 - 52 %    MONOCYTES 2 (L) 3 - 10 %    EOSINOPHILS 1 0 - 5 %    BASOPHILS 0 0 - 2 %    ABS. NEUTROPHILS 7.7 1.8 - 8.0 K/UL    ABS. LYMPHOCYTES 1.3 0.9 - 3.6 K/UL    ABS. MONOCYTES 0.2 0.05 - 1.2 K/UL    ABS. EOSINOPHILS 0.1 0.0 - 0.4 K/UL    ABS. BASOPHILS 0.0 0.0 - 0.1 K/UL    DF AUTOMATED     METABOLIC PANEL, COMPREHENSIVE    Collection Time: 05/22/19  1:39 PM   Result Value Ref Range    Sodium 148 (H) 136 - 145 mmol/L    Potassium 3.2 (L) 3.5 - 5.5 mmol/L    Chloride 121 (H) 100 - 108 mmol/L    CO2 11 (L) 21 - 32 mmol/L    Anion gap 16 3.0 - 18 mmol/L    Glucose 140 (H) 74 - 99 mg/dL    BUN 10 7.0 - 18 MG/DL    Creatinine 0.77 0.6 - 1.3 MG/DL    BUN/Creatinine ratio 13 12 - 20      GFR est AA >60 >60 ml/min/1.73m2    GFR est non-AA >60 >60 ml/min/1.73m2    Calcium 5.3 (LL) 8.5 - 10.1 MG/DL    Bilirubin, total 0.2 0.2 - 1.0 MG/DL    ALT (SGPT) 20 16 - 61 U/L    AST (SGOT) 26 15 - 37 U/L    Alk.  phosphatase 66 45 - 117 U/L    Protein, total 4.2 (L) 6.4 - 8.2 g/dL    Albumin 2.0 (L) 3.4 - 5.0 g/dL    Globulin 2.2 2.0 - 4.0 g/dL    A-G Ratio 0.9 0.8 - 1.7     MAGNESIUM    Collection Time: 05/22/19  1:39 PM   Result Value Ref Range    Magnesium 1.6 1.6 - 2.6 mg/dL   CARDIAC PANEL,(CK, CKMB & TROPONIN)    Collection Time: 05/22/19  1:39 PM   Result Value Ref Range    CK 92 39 - 308 U/L    CK - MB <1.0 <3.6 ng/ml    CK-MB Index  0.0 - 4.0 %     CALCULATION NOT PERFORMED WHEN RESULT IS BELOW LINEAR LIMIT    Troponin-I, QT 0.08 (H) 0.0 - 0.045 NG/ML   AMMONIA    Collection Time: 05/22/19  1:39 PM   Result Value Ref Range    Ammonia 122 (H) 11 - 32 UMOL/L   ETHYL ALCOHOL    Collection Time: 05/22/19  1:39 PM   Result Value Ref Range    ALCOHOL(ETHYL),SERUM <3 0 - 3 MG/DL   SALICYLATE    Collection Time: 05/22/19  1:39 PM   Result Value Ref Range    Salicylate level 2.8 2.8 - 20.0 MG/DL   ACETAMINOPHEN    Collection Time: 05/22/19  1:39 PM   Result Value Ref Range    Acetaminophen level 6 (L) 10.0 - 30.0 ug/mL   HEMOGLOBIN A1C WITH EAG    Collection Time: 05/22/19  1:39 PM   Result Value Ref Range    Hemoglobin A1c 5.2 4.2 - 5.6 %    Est. average glucose 103 mg/dL   POC G3    Collection Time: 05/22/19  2:04 PM   Result Value Ref Range    Device: VENT      FIO2 (POC) 100 %    pH (POC) 7.127 (LL) 7.35 - 7.45      pCO2 (POC) 56.1 (H) 35.0 - 45.0 MMHG    pO2 (POC) 285 (H) 80 - 100 MMHG    HCO3 (POC) 18.6 (L) 22 - 26 MMOL/L    sO2 (POC) 100 (H) 92 - 97 %    Base deficit (POC) 11 mmol/L    Mode ASSIST CONTROL      Tidal volume 450 ml    Set Rate 14 bpm    PEEP/CPAP (POC) 5.0 cmH2O    Allens test (POC) YES      Inspiratory Time 1.0 sec    Total resp. rate 14      Site RIGHT RADIAL      Patient temp.  98.6      Specimen type (POC) ARTERIAL      Performed by Jeannie Nieves, URINE    Collection Time: 05/22/19  2:27 PM   Result Value Ref Range    BENZODIAZEPINES NEGATIVE  NEG      BARBITURATES NEGATIVE  NEG      THC (TH-CANNABINOL) POSITIVE (A) NEG      OPIATES NEGATIVE  NEG      PCP(PHENCYCLIDINE) NEGATIVE  NEG      COCAINE NEGATIVE  NEG      AMPHETAMINES NEGATIVE  NEG      METHADONE NEGATIVE  NEG      HDSCOM (NOTE)    URINALYSIS W/ RFLX MICROSCOPIC    Collection Time: 05/22/19  2:27 PM   Result Value Ref Range    Color YELLOW      Appearance CLEAR      Specific gravity 1.018 1.005 - 1.030      pH (UA) 5.5 5.0 - 8.0      Protein 300 (A) NEG mg/dL    Glucose NEGATIVE  NEG mg/dL    Ketone NEGATIVE  NEG mg/dL    Bilirubin NEGATIVE  NEG      Blood MODERATE (A) NEG      Urobilinogen 0.2 0.2 - 1.0 EU/dL    Nitrites NEGATIVE  NEG      Leukocyte Esterase NEGATIVE  NEG     URINE MICROSCOPIC ONLY    Collection Time: 05/22/19  2:27 PM   Result Value Ref Range    WBC 0 to 1 0 - 5 /hpf    RBC 0 to 3 0 - 5 /hpf    Epithelial cells FEW 0 - 5 /lpf    Bacteria 1+ (A) NEG /hpf    Mucus 2+ (A) NEG /lpf   EKG, 12 LEAD, SUBSEQUENT    Collection Time: 05/22/19  2:50 PM   Result Value Ref Range    Ventricular Rate 100 BPM    Atrial Rate 100 BPM    P-R Interval 206 ms    QRS Duration 102 ms    Q-T Interval 356 ms    QTC Calculation (Bezet) 459 ms    Calculated P Axis 77 degrees    Calculated R Axis 15 degrees    Calculated T Axis 64 degrees    Diagnosis       Normal sinus rhythm  Incomplete right bundle branch block  Borderline ECG  When compared with ECG of 22-MAY-2019 13:24,  QRS axis shifted right  Criteria for Inferior infarct are no longer present  Confirmed by Edwin Childs MD, -- ([id number]) on 5/22/2019 4:10:11 PM     CULTURE, RESPIRATORY/SPUTUM/BRONCH W Marlys Cheryl STAIN    Collection Time: 05/22/19  4:10 PM   Result Value Ref Range    Special Requests: NO SPECIAL REQUESTS      GRAM STAIN <10 WBC/lpf      GRAM STAIN <10 EPI/lpf      GRAM STAIN MUCUS PRESENT      GRAM STAIN        FEW GRAM POSITIVE COCCI IN PAIRS IN CHAINS IN GROUPS    Culture result: PENDING    GLUCOSE, POC    Collection Time: 05/22/19  5:34 PM   Result Value Ref Range    Glucose (POC) 84 70 - 770 mg/dL   METABOLIC PANEL, BASIC    Collection Time: 05/22/19  6:50 PM   Result Value Ref Range    Sodium 142 136 - 145 mmol/L    Potassium 4.5 3.5 - 5.5 mmol/L    Chloride 110 (H) 100 - 108 mmol/L    CO2 24 21 - 32 mmol/L    Anion gap 8 3.0 - 18 mmol/L    Glucose 83 74 - 99 mg/dL    BUN 14 7.0 - 18 MG/DL    Creatinine 1.37 (H) 0.6 - 1.3 MG/DL    BUN/Creatinine ratio 10 (L) 12 - 20      GFR est AA >60 >60 ml/min/1.73m2    GFR est non-AA 52 (L) >60 ml/min/1.73m2    Calcium 8.2 (L) 8.5 - 10.1 MG/DL   CULTURE, BLOOD    Collection Time: 05/22/19  6:50 PM   Result Value Ref Range    Special Requests: NO SPECIAL REQUESTS      Culture result: NO GROWTH AFTER 11 HOURS     LACTIC ACID    Collection Time: 05/22/19  6:50 PM   Result Value Ref Range    Lactic acid 1.9 0.4 - 2.0 MMOL/L   CARDIAC PANEL,(CK, CKMB & TROPONIN)    Collection Time: 05/22/19  7:45 PM   Result Value Ref Range    CK 1,466 (H) 39 - 308 U/L    CK - MB 12.0 (H) <3.6 ng/ml CK-MB Index 0.8 0.0 - 4.0 %    Troponin-I, QT 0.35 (H) 0.0 - 0.045 NG/ML   CULTURE, BLOOD    Collection Time: 05/22/19  7:45 PM   Result Value Ref Range    Special Requests: NO SPECIAL REQUESTS      Culture result: NO GROWTH AFTER 11 HOURS     GLUCOSE, POC    Collection Time: 05/22/19 11:33 PM   Result Value Ref Range    Glucose (POC) 102 70 - 110 mg/dL   PHENYTOIN    Collection Time: 05/22/19 11:55 PM   Result Value Ref Range    Phenytoin <0.4 (L) 10.0 - 20.0 ug/mL   CARDIAC PANEL,(CK, CKMB & TROPONIN)    Collection Time: 05/22/19 11:55 PM   Result Value Ref Range    CK 1,608 (H) 39 - 308 U/L    CK - MB 11.7 (H) <3.6 ng/ml    CK-MB Index 0.7 0.0 - 4.0 %    Troponin-I, QT 0.38 (H) 0.0 - 0.045 NG/ML   GLUCOSE, POC    Collection Time: 05/23/19  5:22 AM   Result Value Ref Range    Glucose (POC) 101 70 - 110 mg/dL   POC G3    Collection Time: 05/23/19  5:40 AM   Result Value Ref Range    Device: VENT      FIO2 (POC) 40 %    pH (POC) 7.312 (L) 7.35 - 7.45      pCO2 (POC) 40.4 35.0 - 45.0 MMHG    pO2 (POC) 156 (H) 80 - 100 MMHG    HCO3 (POC) 20.5 (L) 22 - 26 MMOL/L    sO2 (POC) 99 (H) 92 - 97 %    Base deficit (POC) 6 mmol/L    Mode ASSIST CONTROL      Tidal volume 450 ml    Set Rate 18 bpm    PEEP/CPAP (POC) 5 cmH2O    Allens test (POC) YES      Inspiratory Time 1.0 sec    Total resp.  rate 18      Site RIGHT RADIAL      Patient temp. 97.8      Specimen type (POC) ARTERIAL      Performed by Chelsea Hammond     Volume control plus YES     METABOLIC PANEL, COMPREHENSIVE    Collection Time: 05/23/19  5:51 AM   Result Value Ref Range    Sodium 145 136 - 145 mmol/L    Potassium 4.4 3.5 - 5.5 mmol/L    Chloride 114 (H) 100 - 108 mmol/L    CO2 24 21 - 32 mmol/L    Anion gap 7 3.0 - 18 mmol/L    Glucose 107 (H) 74 - 99 mg/dL    BUN 17 7.0 - 18 MG/DL    Creatinine 1.62 (H) 0.6 - 1.3 MG/DL    BUN/Creatinine ratio 10 (L) 12 - 20      GFR est AA 52 (L) >60 ml/min/1.73m2    GFR est non-AA 43 (L) >60 ml/min/1.73m2    Calcium 7.9 (L) 8.5 - 10.1 MG/DL    Bilirubin, total 0.4 0.2 - 1.0 MG/DL    ALT (SGPT) 42 16 - 61 U/L    AST (SGOT) 83 (H) 15 - 37 U/L    Alk. phosphatase 82 45 - 117 U/L    Protein, total 5.6 (L) 6.4 - 8.2 g/dL    Albumin 2.8 (L) 3.4 - 5.0 g/dL    Globulin 2.8 2.0 - 4.0 g/dL    A-G Ratio 1.0 0.8 - 1.7     CBC W/O DIFF    Collection Time: 05/23/19  5:51 AM   Result Value Ref Range    WBC 12.4 4.6 - 13.2 K/uL    RBC 3.90 (L) 4.70 - 5.50 M/uL    HGB 11.7 (L) 13.0 - 16.0 g/dL    HCT 35.8 (L) 36.0 - 48.0 %    MCV 91.8 74.0 - 97.0 FL    MCH 30.0 24.0 - 34.0 PG    MCHC 32.7 31.0 - 37.0 g/dL    RDW 13.2 11.6 - 14.5 %    PLATELET 157 534 - 050 K/uL    MPV 9.2 9.2 - 11.8 FL   AMMONIA    Collection Time: 05/23/19  5:53 AM   Result Value Ref Range    Ammonia 28 11 - 32 UMOL/L         Chemistry Recent Labs     05/23/19  0551 05/22/19  1850 05/22/19  1339   * 83 140*    142 148*   K 4.4 4.5 3.2*   * 110* 121*   CO2 24 24 11*   BUN 17 14 10   CREA 1.62* 1.37* 0.77   CA 7.9* 8.2* 5.3*   MG  --   --  1.6   AGAP 7 8 16   BUCR 10* 10* 13   AP 82  --  66   TP 5.6*  --  4.2*   ALB 2.8*  --  2.0*   GLOB 2.8  --  2.2   AGRAT 1.0  --  0.9        Lactic Acid Lactic acid   Date Value Ref Range Status   05/22/2019 1.9 0.4 - 2.0 MMOL/L Final     Recent Labs     05/22/19  1850   LAC 1.9        Liver Enzymes Protein, total   Date Value Ref Range Status   05/23/2019 5.6 (L) 6.4 - 8.2 g/dL Final     Albumin   Date Value Ref Range Status   05/23/2019 2.8 (L) 3.4 - 5.0 g/dL Final     Globulin   Date Value Ref Range Status   05/23/2019 2.8 2.0 - 4.0 g/dL Final     A-G Ratio   Date Value Ref Range Status   05/23/2019 1.0 0.8 - 1.7   Final     AST (SGOT)   Date Value Ref Range Status   05/23/2019 83 (H) 15 - 37 U/L Final     Alk.  phosphatase   Date Value Ref Range Status   05/23/2019 82 45 - 117 U/L Final     Recent Labs     05/23/19  0551 05/22/19  1339   TP 5.6* 4.2*   ALB 2.8* 2.0*   GLOB 2.8 2.2   AGRAT 1.0 0.9   SGOT 83* 26   AP 82 66        CBC w/Diff Recent Labs     05/23/19  0551 05/22/19  1339   WBC 12.4 9.3   RBC 3.90* 3.35*   HGB 11.7* 10.0*   HCT 35.8* 31.4*    158   GRANS  --  83*   LYMPH  --  14*   EOS  --  1        Cardiac Enzymes Lab Results   Component Value Date/Time    CPK 1,608 (H) 05/22/2019 11:55 PM    CPK 1,466 (H) 05/22/2019 07:45 PM    CPK 92 05/22/2019 01:39 PM    CKMB 11.7 (H) 05/22/2019 11:55 PM    CKMB 12.0 (H) 05/22/2019 07:45 PM    CKMB <1.0 05/22/2019 01:39 PM    CKND1 0.7 05/22/2019 11:55 PM    CKND1 0.8 05/22/2019 07:45 PM    CKND1  05/22/2019 01:39 PM     CALCULATION NOT PERFORMED WHEN RESULT IS BELOW LINEAR LIMIT    TROIQ 0.38 (H) 05/22/2019 11:55 PM    TROIQ 0.35 (H) 05/22/2019 07:45 PM    TROIQ 0.08 (H) 05/22/2019 01:39 PM        BNP No results found for: BNP, BNPP, XBNPT     Coagulation No results for input(s): PTP, INR, APTT in the last 72 hours.     No lab exists for component: INREXT, INREXT      Thyroid  No results found for: T4, T3U, TSH, TSHEXT, TSHEXT    No results found for: T4     Urinalysis Lab Results   Component Value Date/Time    Color YELLOW 05/22/2019 02:27 PM    Appearance CLEAR 05/22/2019 02:27 PM    Specific gravity 1.018 05/22/2019 02:27 PM    pH (UA) 5.5 05/22/2019 02:27 PM    Protein 300 (A) 05/22/2019 02:27 PM    Glucose NEGATIVE  05/22/2019 02:27 PM    Ketone NEGATIVE  05/22/2019 02:27 PM    Bilirubin NEGATIVE  05/22/2019 02:27 PM    Urobilinogen 0.2 05/22/2019 02:27 PM    Nitrites NEGATIVE  05/22/2019 02:27 PM    Leukocyte Esterase NEGATIVE  05/22/2019 02:27 PM    Epithelial cells FEW 05/22/2019 02:27 PM    Bacteria 1+ (A) 05/22/2019 02:27 PM    WBC 0 to 1 05/22/2019 02:27 PM    RBC 0 to 3 05/22/2019 02:27 PM        Micro  Recent Labs     05/22/19 1945 05/22/19 1850 05/22/19  1610   CULT NO GROWTH AFTER 11 HOURS NO GROWTH AFTER 11 HOURS PENDING     Recent Labs     05/22/19 1945 05/22/19 1850 05/22/19  1610   CULT NO GROWTH AFTER 11 HOURS NO GROWTH AFTER 11 HOURS PENDING        ABG Recent Labs     05/23/19  0540 05/22/19  1404   PHI 7.312* 7.127*   PCO2I 40.4 56.1*   PO2I 156* 285*   HCO3I 20.5* 18.6*   FIO2I 40 100        PFT       Ultrasound       LE Doppler       ECHO       CT (Most Recent) (CT chest reviewed by me) Results from Hospital Encounter encounter on 05/22/19   CT HEAD WO CONT    Narrative EXAM: CT head    INDICATION: Seizure, patient unresponsive. COMPARISON: None. TECHNIQUE: Axial CT imaging of the head was performed without intravenous  contrast.    One or more dose reduction techniques were used on this CT: automated exposure  control, adjustment of the mAs and/or kVp according to patient size, and  iterative reconstruction techniques. The specific techniques used on this CT  exam have been documented in the patient's electronic medical record. Digital  Imaging and Communications in Medicine (DICOM) format image data are available  to nonaffiliated external healthcare facilities or entities on a secure, media  free, reciprocally searchable basis with patient authorization for at least a  12-month period after this study. _______________    FINDINGS:    BRAIN AND POSTERIOR FOSSA: Cortical sulci volume is within normal limits for  patient age. The ventricular size and configuration is normal. Physiologic  bilateral basal ganglia calcifications are present. There is no intracranial  hemorrhage, mass effect, or midline shift. The gray-white matter differentiation  is within normal limits. Periventricular white matter low-attenuation is  present. EXTRA-AXIAL SPACES AND MENINGES: There are no abnormal extra-axial fluid  collections. CALVARIUM: Intact. SINUSES: Clear. OTHER: None.    _______________      Impression 1. No acute intracranial abnormality. 2. Mild periventricular white matter low-attenuation; nonspecific finding  favored to reflect sequela of chronic ischemic microvascular change.       CRITICAL RESULT:  CODE S results called to Dr. Zaheer Soler in the emergency room  prior to dictation at 1425 hours on 5/22/2019            XR (Most Recent). CXR  reviewed by me and compared with previous CXR Results from Hospital Encounter encounter on 05/22/19   XR CHEST PORT    Narrative EXAM: XR CHEST PORT    CLINICAL INDICATION/HISTORY: seizure  -Additional: Intubated    COMPARISON: None    TECHNIQUE: Frontal view of the chest    _______________    FINDINGS:  SUPPORT LINES AND TUBES:Endotracheal tube tip is at the thoracic inlet, 8.7 cm  above the juwan. HEART AND MEDIASTINUM: Midline cardiac silhouette, normal in size. Unremarkable  hilar vascular structures. LUNGS AND PLEURAL SPACES: Linear to discoid left basilar and peripheral right  midlung interstitial opacity. No focal consolidation, parenchymal opacity. No  pneumothorax or pleural effusion. BONY THORAX AND SOFT TISSUES: No acute or destructive osseous abnormality. _______________      Impression 1. ET tube at thoracic inlet, superior aspect of acceptable normal limits. 2. Left basilar and peripheral right midlung interstitial opacities representing  atelectasis and/or scarring. No focal consolidation          EEG 5/23/19: IMPRESSION: This EEG is abnormal due to generalized slowing and low amplitude background, which is an indicator of diffuse cerebral dysfunction from any cause including -but not limited to- toxic, metabolic and infectious etiologies. Intermittent beta-range waves are likely caused by sedative medications such as benzodiazepine. There are no ictal or interictal findings with a specific correlation with seizures. CXR 5/23/19: reviewed: b/l mild PVC vs scarring. RIJ, OGT and ETT in place    IMPRESSION:   · Recurrent seizure, with hx of epilepsy (home regimen keppra and dilantin)  · Left weakness, ? Pako's paralysis. · Respiratory failure / apnea, intubated in ER 5/22/19  · Possible aspiration pneumonia vs pneumonitis.    · Hypocalcemia  · Hypokalemia, replaced  · Hypernatremia · Elevated ammonia likely due to seizure, resolved. · Elevated CPK, likely due to seizure  · Mildly abnormal troponin likely due to seizure  · Hx of DVT (not on anticoagulation, reported by spouse)  · THC positive   · Anemia, no bleeding. · Code status: full      RECOMMENDATIONS:   Respiratory: ventilator orderset and protocol. ABG ok. No more seizure. EEG generalized slowing. Stop sedation. SBT now. If met criteria, then extubate. Ventilator bundle & Sedation protocol followed. Daily sedation holiday, assessment for readiness for SBT and then re-titrate if required. Chlorhexidine mouth washes. Keep SPO2 >=92%. HOB 30 degree elevation all the time. Aggressive pulmonary toileting. Aspiration precautions. Incentive spirometry. CVS: labile BP, on levophed 2. Trop peak 0.38, likely due to elevated CPK with seizure. Echo pending. Wife reported that he had DVT in legs about 3 weeks ago at [facility], but he is not on any anticoagulation. Will check PVL. ID: [id number] for possible aspiration. D/c clinda (higher risk for c diff with zosyn+clinda). Endotracheal aspirate cx GPC. Blood cx NGTD  Follow cultures. Deescalate antibiotic when appropriate. Hematology/Oncology: mild anemia, no active external bleeding  Renal: monitor renal function. Follow Na. C/w IVF as below. GI/: no acute issues  Endocrine: Monitor BS. Neurology: per wife, he is compliant to keppra and dilantin at home. Dilantin level very low. C/w keppra and dilantin. CT head nil acute. Left paralysis improved per RN before sedated with propofol started in ER. EEG generalized slowing. Ammonia normalized. Dr. Bonnie Rajput on board. Pain/Sedation: sedation protocol as above. Stopped for SBT  Skin/Wound: no acute issues  Electrolytes: Replace electrolytes per ICU electrolyte replacement protocol. IVF: D5 1/2 NS at 125. Nutrition: NPO for now  Prophylaxis: DVT Prophylaxis: heparin. GI Prophylaxis: protoni.    Restraints: Wrist soft restraints for patient interfering with medical therapy/management and patient safety. Lines/Tubes: PIV  ETT: 5/22/19  OGT: 5/22/19  Central line: RIJ 5/22/19 (site without sign of infection. Medically necessary, will remove it when not needed. Centra line bundle followed). Miller: 5/22/19 (Medically necessary for strict input/output monitoring in critically ill patient, will remove it when not needed. Miller bundle followed). Will defer respective systems problem management to primary and other respective consultant and follow patient in ICU with primary and other medical team.  Further recommendations will be based on the patient's response to recommended treatment and results of the investigation ordered. Quality Care: PPI, DVT prophylaxis, HOB elevated, Infection control all reviewed and addressed. Care of plan d/w RN, RT, MDR.   D/w wife at bedside, updated. High complexity decision making was performed during the evaluation of this patient at high risk for decompensation with multiple organ involvement. Total critical care time spent rendering care exclusive of procedures: 36 minutes.          Kenya Ying MD  5/23/2019

## 2019-05-23 NOTE — PROGRESS NOTES
1108 patient extubated per Dr Karine Barba verbal order and placed on 2lpm nc. Patient tolerated well.

## 2019-05-23 NOTE — CONSULTS
NEUROLOGY CONSULTATION NOTE    Patient: Bar Estrada MRN: [id number]  CSN: [id number]    YOB: 1950  Age: 79 y.o. Sex: male    DOA: 5/22/2019 LOS:  LOS: 1 day        Requesting Physician: Dr. Trace Nugent and Dr. Castro Double  Reason for Consultation: Seizure    HISTORY OF PRESENT ILLNESS:   Bar Estrada is a 79 y.o. male who I have been asked to see for seizure. Patient is intubated and sedated currently, the following history of our from our records. Bar Estrada is a 79 y.o. male with PMHX of seizures presents to the emergency department after witnessed seizure. Unclear what patient takes for seizures and when his last seizure was. It is reported that the patient was sitting outside and when security pass noticed that the patient was having seizure. When paramedics arrived, patient received Ativan which stopped seizures. However on arrival, patient was noted to be hypoxic at 86% on nonrebreather. Minimally responsive, only opening his eyes and nodding. Unable to verbalize history. On assessment, noted to have left-sided flaccid paralysis. Glucose in the 200s. Initially noted to be hypertensive however became hypotensive. PAST MEDICAL HISTORY:  No past medical history on file. PAST SURGICAL HISTORY:  No past surgical history on file. FAMILY HISTORY:  No family history on file.   SOCIAL HISTORY:  Social History     Socioeconomic History    Marital status: SINGLE     Spouse name: Not on file    Number of children: Not on file    Years of education: Not on file    Highest education level: Not on file     MEDICATIONS:  Current Facility-Administered Medications   Medication Dose Route Frequency    perflutren lipid microspheres (DEFINITY) in NS bolus IV  1 mL IntraVENous RAD ONCE    propofol (DIPRIVAN) infusion  0-50 mcg/kg/min IntraVENous CONTINUOUS    ELECTROLYTE REPLACEMENT PROTOCOL - Magnesium  1 Each Other PRN    ELECTROLYTE REPLACEMENT PROTOCOL - Phosphorus  1 Each Other PRN    ELECTROLYTE REPLACEMENT PROTOCOL - Calcium  1 Each Other PRN    pantoprazole (PROTONIX) 40 mg in sodium chloride 0.9% 10 mL injection  40 mg IntraVENous DAILY    midazolam (PF) (VERSED) injection 1 mg  1 mg IntraVENous Q2H PRN    fentaNYL citrate (PF) injection 25 mcg  25 mcg IntraVENous Q4H PRN    chlorhexidine (PERIDEX) 0.12 % mouthwash 10 mL  10 mL Oral Q12H    albuterol-ipratropium (DUO-NEB) 2.5 MG-0.5 MG/3 ML  3 mL Nebulization Q4H PRN    heparin (porcine) injection 5,000 Units  5,000 Units SubCUTAneous Q8H    NOREPINephrine (LEVOPHED) 8 mg in dextrose 5% 250 mL infusion  2-16 mcg/min IntraVENous TITRATE    glucose chewable tablet 16 g  4 Tab Oral PRN    glucagon (GLUCAGEN) injection 1 mg  1 mg IntraMUSCular PRN    dextrose (D50W) injection syrg 12.5-25 g  25-50 mL IntraVENous PRN    0.9% sodium chloride 1,000 mL with mvi, adult no. 4 with vit K 10 mL, thiamine 295 mg, folic acid 1 mg infusion   IntraVENous Q24H    insulin lispro (HUMALOG) injection   SubCUTAneous Q6H    piperacillin-tazobactam (ZOSYN) 3.375 g in 0.9% sodium chloride (MBP/ADV) 100 mL MBP  3.375 g IntraVENous Q8H    dextrose 5 % - 0.45% NaCl infusion  125 mL/hr IntraVENous CONTINUOUS    ELECTROLYTE REPLACEMENT PROTOCOL - Potassium (Renal)  1 Each Other PRN    phenytoin (DILANTIN) 300 mg in 0.9% sodium chloride 50 mL IVPB  300 mg IntraVENous BID    levETIRAcetam (KEPPRA) 1500 mg in saline (iso-osm) 100 ml IVPB  1,500 mg IntraVENous Q12H    potassium chloride in water 10 mEq/100 mL IVPB premix pgbk         Prior to Admission medications    Medication Sig Start Date End Date Taking? Authorizing Provider   levETIRAcetam (KEPPRA) 750 mg tablet Take 1,500 mg by mouth two (2) times a day. Yes Provider, Historical   phenytoin sodium extended (DILANTIN PO) Take 300 mg by mouth two (2) times a day. Yes Provider, Historical   lacosamide (VIMPAT) 100 mg tab tablet Take 100 mg by mouth two (2) times a day.    Yes Provider, Historical       ALLERGIES:  No Known Allergies    Review of Systems UTO 2/2 AMS      PHYSICAL EXAMINATION:     Visit Vitals  /78   Pulse 70   Temp 97.8 °F (36.6 °C)   Resp 18   Wt 83.8 kg (184 lb 11.9 oz)   SpO2 100%      O2 Device: Endotracheal tube  GENERAL: Intubated and sedated. HEENT: Moist mucous membranes, sclerae anicteric, scalp is atraumatic. CVS: Regular rate and rhythm, no murmurs or gallops. No carotid bruits. PULMONARY: on ventilation  EXTREMITIES: No spontaneous movement  ABDOMEN: Soft, nontender. SKIN: No rashes or ecchymoses. Warm and dry. NEUROLOGIC: Coma, not arousal to verbal or physical stimuli  Cranial nerves: Face is symmetric, no corneal reflex or gag reflex. Motor: No spontaneous movement on all four extremities  Sensory: pain withdraw at bilateral lower limbs. Coordination: Deferred due to unconsciousness. Deep tendon reflexes: Deep tendon reflex are absent bilaterally. Toes are equivocal bilaterally. Gait assessment: Deferred due to unconsciousness. Labs: Results:       Chemistry Recent Labs     05/23/19  0551 05/22/19  1850 05/22/19  1339   * 83 140*    142 148*   K 4.4 4.5 3.2*   * 110* 121*   CO2 24 24 11*   BUN 17 14 10   CREA 1.62* 1.37* 0.77   CA 7.9* 8.2* 5.3*   AGAP 7 8 16   BUCR 10* 10* 13   AP 82  --  66   TP 5.6*  --  4.2*   ALB 2.8*  --  2.0*   GLOB 2.8  --  2.2   AGRAT 1.0  --  0.9      CBC w/Diff Recent Labs     05/23/19  0551 05/22/19  1339   WBC 12.4 9.3   RBC 3.90* 3.35*   HGB 11.7* 10.0*   HCT 35.8* 31.4*    158   GRANS  --  83*   LYMPH  --  14*   EOS  --  1      Cardiac Enzymes Recent Labs     05/22/19  2355 05/22/19  1945   CPK 1,608* 1,466*   CKND1 0.7 0.8      Coagulation No results for input(s): PTP, INR, APTT in the last 72 hours.     No lab exists for component: INREXT    Lipid Panel No results found for: CHOL, CHOLPOCT, CHOLX, CHLST, CHOLV, 589958, HDL, LDL, LDLC, DLDLP, 397568, VLDLC, VLDL, TGLX, TRIGL, TRIGP, TGLPOCT, CHHD, CHHDX   BNP No results for input(s): BNPP in the last 72 hours. Liver Enzymes Recent Labs     05/23/19  0551   TP 5.6*   ALB 2.8*   AP 82   SGOT 83*      Thyroid Studies No results found for: T4, T3U, TSH, TSHEXT       Radiology:  Ct Head Wo Cont    Result Date: 5/22/2019  EXAM: CT head INDICATION: Seizure, patient unresponsive. COMPARISON: None. TECHNIQUE: Axial CT imaging of the head was performed without intravenous contrast. One or more dose reduction techniques were used on this CT: automated exposure control, adjustment of the mAs and/or kVp according to patient size, and iterative reconstruction techniques. The specific techniques used on this CT exam have been documented in the patient's electronic medical record. Digital Imaging and Communications in Medicine (DICOM) format image data are available to nonaffiliated external healthcare facilities or entities on a secure, media free, reciprocally searchable basis with patient authorization for at least a 12-month period after this study. _______________ FINDINGS: BRAIN AND POSTERIOR FOSSA: Cortical sulci volume is within normal limits for patient age. The ventricular size and configuration is normal. Physiologic bilateral basal ganglia calcifications are present. There is no intracranial hemorrhage, mass effect, or midline shift. The gray-white matter differentiation is within normal limits. Periventricular white matter low-attenuation is present. EXTRA-AXIAL SPACES AND MENINGES: There are no abnormal extra-axial fluid collections. CALVARIUM: Intact. SINUSES: Clear. OTHER: None. _______________     1. No acute intracranial abnormality. 2. Mild periventricular white matter low-attenuation; nonspecific finding favored to reflect sequela of chronic ischemic microvascular change.  CRITICAL RESULT:  CODE S results called to Dr. Jayy Modi in the emergency room prior to dictation at 1425 hours on 5/22/2019    Xr Chest Port    Result Date: 5/22/2019  EXAM: XR CHEST PORT CLINICAL INDICATION/HISTORY: seizure -Additional: Intubated COMPARISON: None TECHNIQUE: Frontal view of the chest _______________ FINDINGS: SUPPORT LINES AND TUBES:Endotracheal tube tip is at the thoracic inlet, 8.7 cm above the juwan. HEART AND MEDIASTINUM: Midline cardiac silhouette, normal in size. Unremarkable hilar vascular structures. LUNGS AND PLEURAL SPACES: Linear to discoid left basilar and peripheral right midlung interstitial opacity. No focal consolidation, parenchymal opacity. No pneumothorax or pleural effusion. BONY THORAX AND SOFT TISSUES: No acute or destructive osseous abnormality. _______________     1. ET tube at thoracic inlet, superior aspect of acceptable normal limits. 2. Left basilar and peripheral right midlung interstitial opacities representing atelectasis and/or scarring. No focal consolidation    ASSESSMENT/IMPRESSION:  70-year-old male with past medical history of seizure presents to emergency room after a witness the seizure. Patient was given Ativan via EMS. He was encephalopathic and left-sided flaccid paralysis on arrival. Patient was intubated for hypoxia in ER. Patient was loaded with Keppra  at ED. He was also loaded with phenytoin as well. CT head was unremarkable. EEG did not show any active seizure (please see EEG report for details). Patient's blood pressure is labile, it was hypertensive LED and hypotensive currently. 1. Seizure: resolved, patient does not have any clinical or EEG seizure. 2. 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 Patient is under sedation. 3. Hypotension: possible adverse effect from loading dose of phenytoin and propofol. 4. Encephalopathy: Adverse effect from propofol and high dose of antiepileptic medication. RECOMMENDATIONS:  1. Decrease Keppra to 1000 mg BID. 2. Discontinue phenytoin. 3. Wean off sedation if possible. Limit use of pain medicine and narcotics if possible. 4. Supportive care per pulmonologist and hospitalist.      I will follow the patient.  Pl.ease do not hesitate to return with any questions.    ------------------------------------  Claire Berumen MD  5/23/2019  10:22 AM

## 2019-05-23 NOTE — PROGRESS NOTES
(preliminary) PVL LE: multiple b/l acute, subacute and chronic DVTs. Start heparin for acute DVT. Vascular surgery to be consulted.      Cathryn Brooks MD 5/23/2019 1:28 PM

## 2019-05-23 NOTE — PROGRESS NOTES
0700 Report from Shamika Nogueira RN and Vadim Ferrer RN, gtts verified with offgoing RN  0800 Assessment complete, patient RASS -1 on propofol gtt, NAD, VSS  0948 Sedation off for sedation holiday, possible SBT today  1015 Called RT to begin SBT  1021 SBT started  1108 Patient extubated, OG tube removed  1200 Reassessment, patient awake and doing well off of the vent, confused to situation, fiance at bedside  1315 Vascular tech called to report patient positive for blood clots in the lower extremity, will notify physician  1330 Per Dr Brittny Burton, start heparin gtt, give bolus, consult vascular surgeon regarding chronic DVTs   1332 Paged vascular surgeon on call through [facility] paging system  1400 Heparin bolus and gtt started. PTT and CBC labs drawn prior to administration  1449 Dr Verenice Nunez returned call regarding vascular consult, asked me to add him to the treatment team and he will come see patient when he is done with his case  1545 Wound care RNs present  1600 Reassessment, patient oriented to self, confused about time, situation, and place.  I remind him every time I enter the room but he needs repeated reminding about the situation

## 2019-05-23 NOTE — PROGRESS NOTES
Cm revisited pt regarding [facility] transfer [facility], brandy at bedside, pt gave permission to discuss transfer form with visitors present, cm did explain form to pt, cm was asked by brandy to leave and come at another time so they can discuss first,pt was agreeable, cm will attempt to revisit.

## 2019-05-23 NOTE — WOUND CARE
Wound Care Progress Note New consult placed by Herman Benítez RN for \" sacral wound\" Assessment:  
Communication: Communicative, frustrated Continent Independently repositions Diet: NPO Patient reports no pain Bilateral heel, buttocks skin intact and without erythema. 1. POA Small abrasion to sacrum, most likely from fall during seizure, clinically closed, area appears bruised, also noted intact blister to knee and bruise to knee Wound Recommendations:   
Keep blister covered to prevent rupture Skin Care & Pressure Relief Recommendations: 
Minimize layers of linen/pads under patient to optimize support surface. Turn/reposition approximately every 2 hours and offload heels pillows or Prevalon boots. Manage incontinence / promote continence; Proshield to buttocks and sacrum daily and as needed with incontinence care Discussed above plan with patient & [facility] RN Transition of Care: Plan to follow weekly and per product recommendations while admitted to hospital.

## 2019-05-23 NOTE — PROGRESS NOTES
Bedside and Verbal shift change report given to Manasr[address] 46 (oncoming nurse) by Johnathan Hunter RN (offgoing nurse). Report included the following information SBAR, Intake/Output, MAR, Recent Results, Cardiac Rhythm NSR, 1st AVB and Quality Measures.

## 2019-05-23 NOTE — PROGRESS NOTES
Call received from bedside nurse brandy Burton requesting cm to return regarding [facility] transfer, cm did met at bedside [facility] form presented to pt, noted pt appears frustrated while speaking with heladiolizz, appears somewhat confused of why he's here, not remembering events which is likely from seizure like activities, at this time cm feels more comfortable to wait at least another day to review [facility] Transfer form with pt again , along with pt's daughter who is on her way here from out of town.

## 2019-05-23 NOTE — DIABETES MGMT
GLYCEMIC CONTROL & NUTRITION:      - chart reviewed, no known h/o DM  - No glycemic control needs identified at this time.     Recent Glucose Results:   Lab Results   Component Value Date/Time     (H) 05/23/2019 05:51 AM    GLU 83 05/22/2019 06:50 PM     (H) 05/22/2019 01:39 PM    GLUCPOC 101 05/23/2019 05:22 AM    GLUCPOC 102 05/22/2019 11:33 PM    GLUCPOC 84 05/22/2019 05:34 PM     Rocco St MS, RN, CDE  Glycemic Control Team  [phone]  Pager [phone] (M-TH 8:00-4:30P)  *After Hours pager [phone]

## 2019-05-23 NOTE — PROGRESS NOTES
Hospitalist Progress Note    Patient: Leelee Cunha MRN: [id number]  CSN: [id number]    YOB: 1950  Age: 79 y.o. Sex: male    DOA: 5/22/2019 LOS:  LOS: 1 day          Chief Complaint:    seizures      Assessment/Plan   80 yo BM found down seizing, hypoxic, brought to ER, intubated, and CT head negative-neurology and critical care on case      1. Acute respiratory failure. Intubated, vent protocol, sedation-diprivan  2. Seizures. Known disorder, uncertain on  compliance with meds-for now-IV keppra and dilantin-dil level was low-MRI brain when stable to have test  3. Pako's paralysis and left-sided weakness. As sedated cannot assess him now, but had improved in ER yesterday  4. Hypocalcemia. improved since yesterday  5. Hypokalemia. improved  6. Possible sepsis. continuing IV zosyn and clinda for poss aspiration pneumonia-BP support with levophed, wean as tolerated  7. Aspiration pneumonia. abx as above, pending culture results  8. Hyperammonemia. Improved, likely due to seizures, uncertain if he has baseline liver disease, etoh abuse hx  9.elevated troponins-plan echo today   10. DANTE-continue IVF, Cr has risen, river for strict I/O -if worsens, get renal US    remains critically ill  Continue pressor support    DVT prophylaxis:heparin    Disposition : SNF  Patient Active Problem List   Diagnosis Code    Seizure ([facility] [address] R56.9    Hyperammonemia ([address] E72.20    Pako's paralysis ([address] G83.84    Acute respiratory failure ([address] J96.00    Hypocalcemia E83.51    Hypokalemia E87.6    Aspiration pneumonia ([address] J69.0       Subjective:    He is sedated on vent  No acute events overnight      Review of systems:    Unable to obtain      Vital signs/Intake and Output:  Visit Vitals  BP (!) 129/99   Pulse 78   Temp 97.8 °F (36.6 °C)   Resp 16   Wt 83.8 kg (184 lb 11.9 oz)   SpO2 100%     Current Shift:  No intake/output data recorded.   Last three shifts:  05/21 1901 - 05/23 0700  In: 5656.3 [I.V.:5656.3]  Out: 8309 [Urine:3375]    Exam:    General: sedate AAM, NAD, intubated  CVS:Regular rate and rhythm, no M/R/G, S1/S2 heard, no thrill  Lungs:Clear to auscultation bilaterally, no wheezes, rhonchi, or rales  Abdomen: Soft, Nontender, No distention, Normal Bowel sounds, No hepatomegaly  Extremities: No C/C/E, pulses palpable 2+  Neuro:sedate                  Labs: Results:       Chemistry Recent Labs     05/23/19  0551 05/22/19  1850 05/22/19  1339   * 83 140*    142 148*   K 4.4 4.5 3.2*   * 110* 121*   CO2 24 24 11*   BUN 17 14 10   CREA 1.62* 1.37* 0.77   CA 7.9* 8.2* 5.3*   AGAP 7 8 16   BUCR 10* 10* 13   AP 82  --  66   TP 5.6*  --  4.2*   ALB 2.8*  --  2.0*   GLOB 2.8  --  2.2   AGRAT 1.0  --  0.9      CBC w/Diff Recent Labs     05/23/19  0551 05/22/19  1339   WBC 12.4 9.3   RBC 3.90* 3.35*   HGB 11.7* 10.0*   HCT 35.8* 31.4*    158   GRANS  --  83*   LYMPH  --  14*   EOS  --  1      Cardiac Enzymes Recent Labs     05/22/19  2355 05/22/19  1945   CPK 1,608* 1,466*   CKND1 0.7 0.8      Coagulation No results for input(s): PTP, INR, APTT in the last 72 hours. No lab exists for component: INREXT    Lipid Panel No results found for: CHOL, CHOLPOCT, CHOLX, CHLST, CHOLV, 159239, HDL, LDL, LDLC, DLDLP, 945230, VLDLC, VLDL, TGLX, TRIGL, TRIGP, TGLPOCT, CHHD, CHHDX   BNP No results for input(s): BNPP in the last 72 hours.    Liver Enzymes Recent Labs     05/23/19  0551   TP 5.6*   ALB 2.8*   AP 82   SGOT 83*      Thyroid Studies No results found for: T4, T3U, TSH, TSHEXT     Procedures/imaging: see electronic medical records for all procedures/Xrays and details which were not copied into this note but were reviewed prior to creation of Aleshia Vasquez MD

## 2019-05-23 NOTE — PROCEDURES
ELECTROENCEPHALOGRAPHY     Patient: Narendra Mac MRN: [id number]  CSN: [id number]    YOB: 1950  Age: 79 y.o. Sex: male    DOA: 5/22/2019 LOS:  LOS: 1 day        Date of Service: 5/22/2019    DICTATING: Jose Cuevas MD     REFERRING PHYSICIAN:      ELECTROENCEPHALOGRAM NUMBER: [id number]    HISTORY OF PRESENT ILLNESS: This is a 77-year-old male was sent to emergency room for a seizure episode. This EEG was performed in order to assess electrodiagnostic risk factors for epilepsy.      ELECTROENCEPHALOGRAM INTERPRETATION: This is a referential and bipolar EEG recorded with a 10-20 system. EEG background shows low amplitude. No posterior dominant rhythm can be observed. There are diffused slow waves. A stage II sleep pattern is not reached. Photic stimulation does not elicits any abnormal activity. There are intermittent beta range frequency waves and EMG contamination. There are no epileptiform discharges or electrographic seizures in the study.      IMPRESSION: This EEG is abnormal due to generalized slowing and low amplitude background, which is an indicator of diffuse cerebral dysfunction from any cause including -but not limited to- toxic, metabolic and infectious etiologies. Intermittent beta-range waves are likely caused by sedative medications such as benzodiazepine. There are no ictal or interictal findings with a specific correlation with seizures. Signed:   Jose Cuevas MD  5/23/2019  9:23 AM

## 2019-05-24 ENCOUNTER — APPOINTMENT (OUTPATIENT)
Dept: VASCULAR SURGERY | Age: 69
DRG: 100 | End: 2019-05-24
Attending: SURGERY
Payer: MEDICARE

## 2019-05-24 ENCOUNTER — APPOINTMENT (OUTPATIENT)
Dept: GENERAL RADIOLOGY | Age: 69
DRG: 100 | End: 2019-05-24
Attending: INTERNAL MEDICINE
Payer: MEDICARE

## 2019-05-24 LAB
ALBUMIN SERPL-MCNC: 2.6 G/DL (ref 3.4–5)
ALBUMIN/GLOB SERPL: 1 {RATIO} (ref 0.8–1.7)
ALP SERPL-CCNC: 69 U/L (ref 45–117)
ALT SERPL-CCNC: 47 U/L (ref 16–61)
ANION GAP SERPL CALC-SCNC: 6 MMOL/L (ref 3–18)
APTT PPP: 62.1 SEC (ref 23–36.4)
APTT PPP: >180 SEC (ref 23–36.4)
APTT PPP: >180 SEC (ref 23–36.4)
AST SERPL-CCNC: 132 U/L (ref 15–37)
ATRIAL RATE: 82 BPM
BASOPHILS # BLD: 0 K/UL (ref 0–0.1)
BASOPHILS NFR BLD: 0 % (ref 0–2)
BILIRUB SERPL-MCNC: 0.4 MG/DL (ref 0.2–1)
BUN SERPL-MCNC: 11 MG/DL (ref 7–18)
BUN/CREAT SERPL: 10 (ref 12–20)
CA-I SERPL-SCNC: 1.13 MMOL/L (ref 1.12–1.32)
CALCIUM SERPL-MCNC: 7.7 MG/DL (ref 8.5–10.1)
CALCULATED P AXIS, ECG09: 77 DEGREES
CALCULATED R AXIS, ECG10: -32 DEGREES
CALCULATED T AXIS, ECG11: 30 DEGREES
CHLORIDE SERPL-SCNC: 113 MMOL/L (ref 100–108)
CK MB CFR SERPL CALC: 0.1 % (ref 0–4)
CK MB CFR SERPL CALC: 0.2 % (ref 0–4)
CK MB SERPL-MCNC: 4.5 NG/ML (ref 5–25)
CK MB SERPL-MCNC: 6.6 NG/ML (ref 5–25)
CK SERPL-CCNC: 3212 U/L (ref 39–308)
CK SERPL-CCNC: 3398 U/L (ref 39–308)
CO2 SERPL-SCNC: 26 MMOL/L (ref 21–32)
CREAT SERPL-MCNC: 1.14 MG/DL (ref 0.6–1.3)
DIAGNOSIS, 93000: NORMAL
DIFFERENTIAL METHOD BLD: ABNORMAL
EOSINOPHIL # BLD: 0.2 K/UL (ref 0–0.4)
EOSINOPHIL NFR BLD: 2 % (ref 0–5)
ERYTHROCYTE [DISTWIDTH] IN BLOOD BY AUTOMATED COUNT: 13.3 % (ref 11.6–14.5)
FIBRINOGEN PPP-MCNC: 227 MG/DL (ref 210–451)
GLOBULIN SER CALC-MCNC: 2.6 G/DL (ref 2–4)
GLUCOSE BLD STRIP.AUTO-MCNC: 112 MG/DL (ref 70–110)
GLUCOSE BLD STRIP.AUTO-MCNC: 113 MG/DL (ref 70–110)
GLUCOSE SERPL-MCNC: 95 MG/DL (ref 74–99)
HCT VFR BLD AUTO: 31.8 % (ref 36–48)
HGB BLD-MCNC: 10.3 G/DL (ref 13–16)
INR PPP: 1.6 (ref 0.8–1.2)
LYMPHOCYTES # BLD: 1.6 K/UL (ref 0.9–3.6)
LYMPHOCYTES NFR BLD: 14 % (ref 21–52)
MAGNESIUM SERPL-MCNC: 2.2 MG/DL (ref 1.6–2.6)
MCH RBC QN AUTO: 29.8 PG (ref 24–34)
MCHC RBC AUTO-ENTMCNC: 32.4 G/DL (ref 31–37)
MCV RBC AUTO: 91.9 FL (ref 74–97)
MIXING STUDIES PPP-IMP: ABNORMAL
MONOCYTES # BLD: 0.9 K/UL (ref 0.05–1.2)
MONOCYTES NFR BLD: 8 % (ref 3–10)
NEUTS SEG # BLD: 8.7 K/UL (ref 1.8–8)
NEUTS SEG NFR BLD: 76 % (ref 40–73)
P-R INTERVAL, ECG05: 192 MS
PHOSPHATE SERPL-MCNC: 2.8 MG/DL (ref 2.5–4.9)
PLATELET # BLD AUTO: 133 K/UL (ref 135–420)
PMV BLD AUTO: 9.4 FL (ref 9.2–11.8)
POTASSIUM SERPL-SCNC: 3 MMOL/L (ref 3.5–5.5)
POTASSIUM SERPL-SCNC: 3.7 MMOL/L (ref 3.5–5.5)
PROT SERPL-MCNC: 5.2 G/DL (ref 6.4–8.2)
PROTHROMBIN TIME: 19.2 SEC (ref 11.5–15.2)
PT 1H P INC PPP: ABNORMAL SEC
PT IMM NP PPP: ABNORMAL SEC
PT MIXING STUDY,CMS3: ABNORMAL
Q-T INTERVAL, ECG07: 362 MS
QRS DURATION, ECG06: 96 MS
QTC CALCULATION (BEZET), ECG08: 422 MS
RBC # BLD AUTO: 3.46 M/UL (ref 4.7–5.5)
SODIUM SERPL-SCNC: 145 MMOL/L (ref 136–145)
TROPONIN I SERPL-MCNC: 0.12 NG/ML (ref 0–0.04)
TROPONIN I SERPL-MCNC: 0.16 NG/ML (ref 0–0.04)
VENTRICULAR RATE, ECG03: 82 BPM
WBC # BLD AUTO: 11.3 K/UL (ref 4.6–13.2)

## 2019-05-24 PROCEDURE — 82962 GLUCOSE BLOOD TEST: CPT

## 2019-05-24 PROCEDURE — 83735 ASSAY OF MAGNESIUM: CPT

## 2019-05-24 PROCEDURE — 85305 CLOT INHIBIT PROT S TOTAL: CPT

## 2019-05-24 PROCEDURE — 74011250636 HC RX REV CODE- 250/636: Performed by: PSYCHIATRY & NEUROLOGY

## 2019-05-24 PROCEDURE — 84100 ASSAY OF PHOSPHORUS: CPT

## 2019-05-24 PROCEDURE — 85670 THROMBIN TIME PLASMA: CPT

## 2019-05-24 PROCEDURE — 83090 ASSAY OF HOMOCYSTEINE: CPT

## 2019-05-24 PROCEDURE — 74011000258 HC RX REV CODE- 258: Performed by: HOSPITALIST

## 2019-05-24 PROCEDURE — 81291 MTHFR GENE: CPT

## 2019-05-24 PROCEDURE — 85598 HEXAGNAL PHOSPH PLTLT NEUTRL: CPT

## 2019-05-24 PROCEDURE — 82550 ASSAY OF CK (CPK): CPT

## 2019-05-24 PROCEDURE — 85613 RUSSELL VIPER VENOM DILUTED: CPT

## 2019-05-24 PROCEDURE — 82330 ASSAY OF CALCIUM: CPT

## 2019-05-24 PROCEDURE — 86147 CARDIOLIPIN ANTIBODY EA IG: CPT

## 2019-05-24 PROCEDURE — 74011250636 HC RX REV CODE- 250/636: Performed by: INTERNAL MEDICINE

## 2019-05-24 PROCEDURE — 65610000006 HC RM INTENSIVE CARE

## 2019-05-24 PROCEDURE — 85384 FIBRINOGEN ACTIVITY: CPT

## 2019-05-24 PROCEDURE — 74011000250 HC RX REV CODE- 250: Performed by: HOSPITALIST

## 2019-05-24 PROCEDURE — 85732 THROMBOPLASTIN TIME PARTIAL: CPT

## 2019-05-24 PROCEDURE — 84132 ASSAY OF SERUM POTASSIUM: CPT

## 2019-05-24 PROCEDURE — 85730 THROMBOPLASTIN TIME PARTIAL: CPT

## 2019-05-24 PROCEDURE — 85420 FIBRINOLYTIC PLASMINOGEN: CPT

## 2019-05-24 PROCEDURE — C9113 INJ PANTOPRAZOLE SODIUM, VIA: HCPCS | Performed by: HOSPITALIST

## 2019-05-24 PROCEDURE — 92526 ORAL FUNCTION THERAPY: CPT

## 2019-05-24 PROCEDURE — 36415 COLL VENOUS BLD VENIPUNCTURE: CPT

## 2019-05-24 PROCEDURE — 81241 F5 GENE: CPT

## 2019-05-24 PROCEDURE — 92610 EVALUATE SWALLOWING FUNCTION: CPT

## 2019-05-24 PROCEDURE — 85025 COMPLETE CBC W/AUTO DIFF WBC: CPT

## 2019-05-24 PROCEDURE — 74011000258 HC RX REV CODE- 258: Performed by: INTERNAL MEDICINE

## 2019-05-24 PROCEDURE — 85301 ANTITHROMBIN III ANTIGEN: CPT

## 2019-05-24 PROCEDURE — 85307 ASSAY ACTIVATED PROTEIN C: CPT

## 2019-05-24 PROCEDURE — 85303 CLOT INHIBIT PROT C ACTIVITY: CPT

## 2019-05-24 PROCEDURE — 74011250636 HC RX REV CODE- 250/636: Performed by: HOSPITALIST

## 2019-05-24 PROCEDURE — 74011250637 HC RX REV CODE- 250/637: Performed by: INTERNAL MEDICINE

## 2019-05-24 PROCEDURE — 85302 CLOT INHIBIT PROT C ANTIGEN: CPT

## 2019-05-24 PROCEDURE — 85610 PROTHROMBIN TIME: CPT

## 2019-05-24 RX ORDER — HEPARIN SODIUM 1000 [USP'U]/ML
40 INJECTION, SOLUTION INTRAVENOUS; SUBCUTANEOUS
Status: COMPLETED | OUTPATIENT
Start: 2019-05-24 | End: 2019-05-24

## 2019-05-24 RX ORDER — POTASSIUM CHLORIDE 750 MG/1
10 TABLET, EXTENDED RELEASE ORAL
Status: COMPLETED | OUTPATIENT
Start: 2019-05-24 | End: 2019-05-24

## 2019-05-24 RX ORDER — LEVETIRACETAM 15 MG/ML
1500 INJECTION INTRAVASCULAR EVERY 12 HOURS
Status: DISCONTINUED | OUTPATIENT
Start: 2019-05-24 | End: 2019-05-25

## 2019-05-24 RX ORDER — AMOXICILLIN AND CLAVULANATE POTASSIUM 875; 125 MG/1; MG/1
1 TABLET, FILM COATED ORAL EVERY 12 HOURS
Status: DISCONTINUED | OUTPATIENT
Start: 2019-05-24 | End: 2019-05-26 | Stop reason: HOSPADM

## 2019-05-24 RX ADMIN — FOLIC ACID: 5 INJECTION, SOLUTION INTRAMUSCULAR; INTRAVENOUS; SUBCUTANEOUS at 10:37

## 2019-05-24 RX ADMIN — PIPERACILLIN SODIUM,TAZOBACTAM SODIUM 3.38 G: 3; .375 INJECTION, POWDER, FOR SOLUTION INTRAVENOUS at 04:59

## 2019-05-24 RX ADMIN — PIPERACILLIN SODIUM,TAZOBACTAM SODIUM 3.38 G: 3; .375 INJECTION, POWDER, FOR SOLUTION INTRAVENOUS at 11:19

## 2019-05-24 RX ADMIN — LEVETIRACETAM 1500 MG: 15 INJECTION INTRAVENOUS at 21:02

## 2019-05-24 RX ADMIN — LEVETIRACETAM 1500 MG: 15 INJECTION INTRAVENOUS at 10:37

## 2019-05-24 RX ADMIN — AMOXICILLIN AND CLAVULANATE POTASSIUM 1 TABLET: 875; 125 TABLET, FILM COATED ORAL at 21:02

## 2019-05-24 RX ADMIN — AMOXICILLIN AND CLAVULANATE POTASSIUM 1 TABLET: 875; 125 TABLET, FILM COATED ORAL at 16:04

## 2019-05-24 RX ADMIN — POTASSIUM CHLORIDE 10 MEQ: 750 TABLET, EXTENDED RELEASE ORAL at 06:04

## 2019-05-24 RX ADMIN — DEXTROSE MONOHYDRATE AND SODIUM CHLORIDE 125 ML/HR: 5; .45 INJECTION, SOLUTION INTRAVENOUS at 02:19

## 2019-05-24 RX ADMIN — HEPARIN SODIUM 3340 UNITS: 1000 INJECTION, SOLUTION INTRAVENOUS; SUBCUTANEOUS at 21:02

## 2019-05-24 RX ADMIN — HEPARIN SODIUM 9 UNITS/KG/HR: 10000 INJECTION, SOLUTION INTRAVENOUS at 19:33

## 2019-05-24 RX ADMIN — SODIUM CHLORIDE 40 MG: 9 INJECTION, SOLUTION INTRAMUSCULAR; INTRAVENOUS; SUBCUTANEOUS at 10:37

## 2019-05-24 NOTE — PROGRESS NOTES
@3509 pt taken over awake alert oriented to self and time. Remains on 2L/nc spo2 100%. Vital signs stable at present. Pt watching TV visitors present. Remains on Heparin drip. Assessment done and documented in appropriate flow sheets. @9140 pt complaint of chest pain, Dr Kevin Jamison was called. 12 lead EKG , Cardiac marker panel, VQ scan stat were ordered and done . Nursing management continues. @7310 pt complaint of pain percocet administered care continues. @2200 pt denies pain observation continues. @0449 pt off floor to Nuclear Med for VQ scan care continues. @2300 pt returned to ICU no new changes observation continues. @0000 pt reassessed no changes. @0400 pt reassessed no changes. @2595 Bedside and Verbal shift change report given to Issac Longoria (oncoming nurse) by Viviane Miguel (offgoing nurse). Report included the following information SBAR, Kardex, Intake/Output, MAR, Recent Results, Med Rec Status and Alarm Parameters .

## 2019-05-24 NOTE — PROGRESS NOTES
Problem: Dysphagia (Adult)  Goal: *Acute Goals and Plan of Care (Insert Text)  Description  Recommendations:  Diet: Regular/thin  Meds: Per patient preference  Oral Care TID    Goals:  Patient will:  1. Tolerate PO trials with 0 s/s overt distress in 4/5 trials  2. Utilize compensatory swallow strategies/maneuvers (decrease bite/sip, size/rate, alt. liq/sol) with min cues in 4/5 trials    Outcome: Progressing Towards Goal  SPEECH LANGUAGE PATHOLOGY BEDSIDE SWALLOW   EVALUATION & TREATMENT     Patient: Ever Anderson (6 y.o. 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 A&Ox4. Oral-motor exam revealed structures grossly intact for mastication and deglutition. Basic cognitive-linguistic functioning appears intact. Patient observed self-feeding thin liquid + straw, puree and solid trials. Pt exhibited adequate bolus cohesion, manipulation and A-P transit. Further exhibited adequate swallow timing/reflex and hyolaryngeal excursion. Able to manipulate and clear with 0 clinical s/s aspiration. Pt safe for regular solid, thin liquid diet. D/w RN, Lindaann Holstein. TREATMENT :  Skilled therapy initiated; Educated pt on aspiration precautions and importance of compensatory swallow techniques to decrease aspiration risk (decrease rate of intake & sip/bite size, upright @HOB for all po intake and ~30 minutes after po); verbalized comprehension. SLP to follow up 1-2x to assess diet tolerance and education. Patient will benefit from skilled intervention to address the above impairments. Patient's rehabilitation potential is considered to be Good  Factors which may influence rehabilitation potential include:   ? None noted  ?             Mental ability/status  ? Medical condition  ? Home/family situation and support systems  ? Safety awareness  ? Pain tolerance/management  ? Other:      PLAN :  Recommendations and Planned Interventions:  Regular/thin  Frequency/Duration: Patient will be followed by speech-language pathology 1-2 times to address goals. Discharge Recommendations: To Be Determined     SUBJECTIVE:   Patient stated ? All I care about is going home? .    OBJECTIVE:   No past medical history on file. No past surgical history on file. Home Situation:      Diet prior to admission: Regular/thin  Current Diet:  Regular/thin     Cognitive and Communication Status:  Neurologic State: Alert  Orientation Level: Oriented X4  Cognition: Follows commands  Perception: Appears intact  Perseveration: No perseveration noted  Safety/Judgement: Awareness of environment  Oral Assessment:  Oral Assessment  Labial: No impairment  Dentition: Natural;Intact  Oral Hygiene: Fair  Lingual: No impairment  Velum: No impairment  Mandible: No impairment  P.O. Trials:  Patient Position: HOB 60  Vocal quality prior to P.O.: No impairment  Consistency Presented: Thin liquid;Puree; Solid  How Presented: Self-fed/presented;Straw;Successive swallows     Bolus Acceptance: No impairment  Bolus Formation/Control: No impairment     Propulsion: No impairment  Oral Residue: None  Initiation of Swallow: No impairment  Laryngeal Elevation: Functional  Aspiration Signs/Symptoms: None  Pharyngeal Phase Characteristics: No impairment, issues, or problems   Effective Modifications: Small sips and bites  Cues for Modifications: Minimal       Oral Phase Severity: No impairment  Pharyngeal Phase Severity : No impairment    PAIN:  Pain level pre-treatment: 0/10   Pain level post-treatment: 0/10     After treatment:   ?            Patient left in no apparent distress sitting up in chair  ?             Patient left in no apparent distress in bed  ?            Call bell left within reach  ? Nursing notified  ? Family present  ? Caregiver present  ? Bed alarm activated    COMMUNICATION/EDUCATION:   ?            Aspiration precautions; swallow safety; compensatory techniques. ?            Patient/family have participated as able in goal setting and plan of care. ?            Patient/family agree to work toward stated goals and plan of care. ?            Patient understands intent and goals of therapy; neutral about participation. ? Patient unable to participate in goal setting/plan of care; educ ongoing with interdisciplinary staff  ? Posted safety precautions in patient's room.     Thank you for this referral.  JAVIER Moya  Time Calculation: 16 mins  Evaluation Time: 8 minutes   Treatment Time: 8 minutes

## 2019-05-24 NOTE — PROGRESS NOTES
Hospitalist Progress Note    Patient: Christen Pillai MRN: [id number]  CSN: [id number]    YOB: 1950  Age: 79 y.o. Sex: male    DOA: 5/22/2019 LOS:  LOS: 2 days          Chief Complaint: seizure          Assessment/Plan       Disposition :  Patient Active Problem List   Diagnosis Code    Seizure ([facility] R56.9    Hyperammonemia ([facility] E72.20    Pako's paralysis ([address] G83.84    Acute respiratory failure ([address] J96.00    Hypocalcemia E83.51    Hypokalemia E87.6    Aspiration pneumonia ([facility] J69.0     78 y/o AA male admitted with acute respiratory failure and seizure. Intubated for a time. Extubated yesterday. 1.  Acute respiratory failure. 2.  Seizure disorder. 3.  Pako's paralysis left. 4.  Possible sepsis from aspiration pneumonia/pneumonitis. 5. Hyperammoniaemia. 6.  DANTE. -Wean o2 as tolerated. -Antibiotics to cover aspiration pneumonia. -DANTE seems better.   -Continue 1500 mg keppra bid for seizure. -Bolster nutrition. Albumin low. ..  -SLP to see. Passed RN swallow eval.   -DVT/GI px.  -Dispo TBD. Subjective:    Extubated yesterday. No seizure activity. Passed RN bedside swallow. Review of systems:    Constitutional: denies fevers, chills, myalgias  Respiratory: denies SOB, cough  Cardiovascular: denies chest pain, palpitations  Gastrointestinal: denies nausea, vomiting, diarrhea      Vital signs/Intake and Output:  Visit Vitals  BP 99/72   Pulse 69   Temp 99.1 °F (37.3 °C)   Resp 18   Ht 6' 6\" (1.981 m)   Wt 83.5 kg (184 lb)   SpO2 96%   BMI 21.26 kg/m²     Current Shift:  No intake/output data recorded.   Last three shifts:  05/22 1901 - 05/24 0700  In: 5401.6 [I.V.:5401.6]  Out: 6750 [Urine:6750]    Exam:    General: Well developed, alert, NAD, OX3  Head/Neck: NCAT, supple, No masses, No lymphadenopathy  CVS:Regular rate and rhythm, no M/R/G, S1/S2 heard, no thrill  Lungs:  Abdomen: Soft, Nontender, No distention, Normal Bowel sounds, No hepatomegaly  Extremities: No edema. Labs: Results:       Chemistry Recent Labs     05/24/19  0415 05/23/19  0551 05/22/19  1850 05/22/19  1339   GLU 95 107* 83 140*    145 142 148*   K 3.7 4.4 4.5 3.2*   * 114* 110* 121*   CO2 26 24 24 11*   BUN 11 17 14 10   CREA 1.14 1.62* 1.37* 0.77   CA 7.7* 7.9* 8.2* 5.3*   AGAP 6 7 8 16   BUCR 10* 10* 10* 13   AP 69 82  --  66   TP 5.2* 5.6*  --  4.2*   ALB 2.6* 2.8*  --  2.0*   GLOB 2.6 2.8  --  2.2   AGRAT 1.0 1.0  --  0.9      CBC w/Diff Recent Labs     05/24/19  0415 05/23/19  1400 05/23/19  0551 05/22/19  1339   WBC 11.3 12.5 12.4 9.3   RBC 3.46* 3.97* 3.90* 3.35*   HGB 10.3* 11.8* 11.7* 10.0*   HCT 31.8* 36.6 35.8* 31.4*   * 155 136 158   GRANS 76* 85*  --  83*   LYMPH 14* 7*  --  14*   EOS 2 0  --  1      Cardiac Enzymes Recent Labs     05/24/19  0420 05/23/19 2001   CPK 3,212* 3,280*   CKND1 0.2 0.3      Coagulation Recent Labs     05/24/19  0420 05/23/19 2001   APTT >180.0* >180.0*       Lipid Panel No results found for: CHOL, CHOLPOCT, CHOLX, CHLST, CHOLV, 581343, HDL, LDL, LDLC, DLDLP, 470401, VLDLC, VLDL, TGLX, TRIGL, TRIGP, TGLPOCT, CHHD, CHHDX   BNP No results for input(s): BNPP in the last 72 hours.    Liver Enzymes Recent Labs     05/24/19 0415   TP 5.2*   ALB 2.6*   AP 69   SGOT 132*      Thyroid Studies No results found for: T4, T3U, TSH, TSHEXT     Procedures/imaging: see electronic medical records for all procedures/Xrays and details which were not copied into this note but were reviewed prior to creation of Juan Ramon Sparks MD

## 2019-05-24 NOTE — CONSULTS
Consult    Patient: Kamron Sweeney MRN: [id number]  SSN: [id number]    YOB: 1950  Age: 79 y.o. Sex: male      Subjective:      Kmaron Sweeney is a 79 y.o. male who is being seen for Bilateral LE DVT  Admitted for seizures. Difficult to interview. Denies LE edema stating only that his right knee area becomes occasionally swollen. Otherwise was sitting in bed and not complaining of much. PMH and PSHX difficult to obtain. Denies recent long travel or hospitalization. Is an active smoker and admits to drug abuse (THC). No past medical history on file. No past surgical history on file. No family history on file.   Social History     Tobacco Use    Smoking status: Not on file   Substance Use Topics    Alcohol use: Not on file      Current Facility-Administered Medications   Medication Dose Route Frequency Provider Last Rate Last Dose    levETIRAcetam (KEPPRA) 1500 mg in saline (iso-osm) 100 ml IVPB  1,500 mg IntraVENous Q12H Hansa Devries MD        heparin 25,000 units in D5W 250 ml infusion  18-36 Units/kg/hr IntraVENous TITRATE Goldie Ortez MD 10 mL/hr at 05/24/19 0642 12 Units/kg/hr at 05/24/19 4642    morphine injection 2 mg  2 mg IntraVENous Q4H PRN Raffaele Hubbard MD        acetaminophen (TYLENOL) tablet 650 mg  650 mg Oral Q4H PRN Raffaele Hubbard MD        oxyCODONE-acetaminophen (PERCOCET) 5-325 mg per tablet 1-2 Tab  1-2 Tab Oral Q6H PRN Raffaele Hubbard MD   1 Tab at 05/23/19 2134    propofol (DIPRIVAN) infusion  0-50 mcg/kg/min IntraVENous CONTINUOUS Agustiady-Darrion Camarillo, DO   Stopped at 05/23/19 0948    ELECTROLYTE REPLACEMENT PROTOCOL - Magnesium  1 Each Other PRN Goldie Ortez MD        ELECTROLYTE REPLACEMENT PROTOCOL - Phosphorus  1 Each Other PRN Goldie Ortez MD        ELECTROLYTE REPLACEMENT PROTOCOL - Calcium  1 Each Other PRN Goldie Ortez MD        pantoprazole (PROTONIX) 40 mg in sodium chloride 0.9% 10 mL injection  40 mg IntraVENous DAILY Jimmie Castellanos MD   40 mg at 05/23/19 1349    midazolam (PF) (VERSED) injection 1 mg  1 mg IntraVENous Q2H PRN Joey Vaughan MD        fentaNYL citrate (PF) injection 25 mcg  25 mcg IntraVENous Q4H PRN Joey Vaughan MD        albuterol-ipratropium (DUO-NEB) 2.5 MG-0.5 MG/3 ML  3 mL Nebulization Q4H PRN Joey Vaughan MD        NOREPINephrine (LEVOPHED) 8 mg in dextrose 5% 250 mL infusion  2-16 mcg/min IntraVENous TITRATE Jimmie Castellanos MD   Stopped at 05/23/19 1043    glucose chewable tablet 16 g  4 Tab Oral PRN Jimmie Castellanos MD        glucagon [facility]) injection 1 mg  1 mg IntraMUSCular PRN Jimmie Castellanos MD        dextrose (D50W) injection syrg 12.5-25 g  25-50 mL IntraVENous PRN Jimmie Castellanos MD        0.9% sodium chloride 1,000 mL with mvi, adult no. 4 with vit K 10 mL, thiamine 031 mg, folic acid 1 mg infusion   IntraVENous Q24H Jimmie Castellanos MD   Stopped at 05/23/19 1842    insulin lispro (HUMALOG) injection   SubCUTAneous Q6H Joey Vaughan MD   Stopped at 05/23/19 0000    piperacillin-tazobactam (ZOSYN) 3.375 g in 0.9% sodium chloride (MBP/ADV) 100 mL MBP  3.375 g IntraVENous Q8H Joey Vaughan MD 25 mL/hr at 05/24/19 0459 3.375 g at 05/24/19 0459    dextrose 5 % - 0.45% NaCl infusion  125 mL/hr IntraVENous CONTINUOUS Jimmie Castellanos  mL/hr at 05/24/19 0219 125 mL/hr at 05/24/19 0219    ELECTROLYTE REPLACEMENT PROTOCOL - Potassium (Renal)  1 Each Other PRN Joey Vaughan MD            No Known Allergies    Review of Systems:  Constitutional: negative  Eyes: negative  Ears, Nose, Mouth, Throat, and Face: negative  Respiratory: negative for cough, hemoptysis, pleurisy/chest pain, wheezing or dyspnea on exertion  Cardiovascular: negative for chest pain, chest pressure/discomfort, palpitations  Gastrointestinal: negative except for dysphagia, nausea, vomiting and change in bowel habits  Genitourinary:negative for frequency and dysuria  Integument/Breast: negative for rash and skin lesion(s)  Hematologic/Lymphatic: negative for easy bruising, bleeding, petechiae, blood in stool or urine and epistaxis  Musculoskeletal:negative for myalgias, neck pain, back pain and muscle weakness  Neurological: positive for seizures, negative for headaches, dizziness and vertigo  Behavioral/Psychiatric: negative for anxiety  Endocrine: negative for temperature intolerance  Allergic/Immunologic: negative    Objective:     Vitals:    05/24/19 0400 05/24/19 0430 05/24/19 0500 05/24/19 0530   BP: 99/66 110/72 106/66 99/72   Pulse: 78 86 86 69   Resp: 14 16 18 18   Temp: 99.1 °F (37.3 °C)      SpO2: 97% 99% 96% 96%   Weight:       Height:            Physical Exam:  GENERAL: alert, distracted, no distress, appears stated age  EYE: negative  LYMPHATIC: Cervical, supraclavicular, and axillary nodes normal.   THROAT & NECK: normal and no erythema or exudates noted. LUNG: clear to auscultation bilaterally  HEART: regular rate and rhythm, S1, S2 normal, no murmur, click, rub or gallop  ABDOMEN: soft, non-tender.  Bowel sounds normal. No masses,  no organomegaly  EXTREMITIES:  extremities normal, atraumatic, no cyanosis or edema  SKIN: Normal.      Assessment:     Hospital Problems  Never Reviewed          Codes Class Noted POA    Seizure ([facility] ICD-10-CM: R56.9  ICD-9-CM: 780.39  5/22/2019 Yes        Hyperammonemia ([facility] ICD-10-CM: E72.20  ICD-9-CM: 270.6  5/22/2019 Yes        Pako's paralysis ([facility] ICD-10-CM: G83.84  ICD-9-CM: 342.90  5/22/2019 Yes        * (Principal) Acute respiratory failure ([facility] ICD-10-CM: J96.00  ICD-9-CM: 518.81  5/22/2019 Yes        Hypocalcemia ICD-10-CM: E83.51  ICD-9-CM: 275.41  5/22/2019 Yes        Hypokalemia ICD-10-CM: E87.6  ICD-9-CM: 276.8  5/22/2019 Yes        Aspiration pneumonia ([address] ICD-10-CM: J69.0  ICD-9-CM: 507.0  5/22/2019 Yes              Plan:     Check IVC and iliac vein duplex US  Continue Heparin drip and likely transition to Oral anticoagulant - Xarelto vs. Coumadin  No plans for thrombolysis at current time given no phlegmasia or massive leg edema  May consider wearing compression hose as well due to concerns for postphlebitic edema in future  Consider hypercoagulable workup as he does not appear to have an inciting cause for development of DVT    Signed By: Meenu Grace MD     May 24, 2019

## 2019-05-24 NOTE — PROGRESS NOTES
0730-Bedside and Verbal shift change report given to YVES Anthony (oncoming nurse) by Ladonna Nick (offgoing nurse). Report included the following information SBAR, Kardex, Intake/Output, MAR and Cardiac Rhythm . 0800- Initial shift assessment complete, see EMR.     1200- Reassessment complete, no changes. 1300- Discussed blood sugar POC testing. Per MD, discontinue POC checks. 1400- CL removed. Pressure held. No bleeding noted, no hematoma. Dressing applied to site. 1600-Reassessment complete, no change. 1800- Attempted to remove river catheter, removed fluid from balloon. Told patient it was going to be removed and patient pulled it out himself. Small amount of blood noted. Will continue to assess and check for post removal void. Called and notified Dr Zaki Lubin. 1930-Bedside and Verbal shift change report given to Lupis Kauffman RN (oncoming nurse) by Gerald Monroy (offgoing nurse). Report included the following information SBAR, Kardex, Intake/Output, MAR and Recent Results.

## 2019-05-24 NOTE — PROGRESS NOTES
Attempted to meet with pt and fiance- pt defers to fiance for decisions. Fiance not in room, waited for her return. Explained to pt that Medicare insurance has been verified, and VA has been notified by [facility]. Will follow up to have VA transfer form signed as time allows. Per account note, pt is not service connected to [place], has PCP through [place]. Medicare is primary and VA secondary for this admission.

## 2019-05-24 NOTE — PROGRESS NOTES
Full consult note to follow    Would check IVC and iliac vein duplex  At current time anticoagulation is sufficient  No  Plans for thrombolysis    If no contraindication to anticoagulation then would not place IVC filter      [address]. [facility] [place]   [phone]

## 2019-05-24 NOTE — PROGRESS NOTES
NEUROLOGY PROGRESS NOTE        Patient: Charisse Lucas        Sex: male          DOA: 5/22/2019  YOB: 1950      Age:  79 y.o.         LOS: 2 days     Identification:  78 yo man with seizure disorder and DVT, who presented with a recurrent seizure          SUBJECTIVE:   The Patient is extubated. The patient is able to converse. He says he had history of seizures for several years since 36s. He gets seizures once in two months or so. He was on Dilantin and levetiracetam but Dilantin was discontinued due to treatment with Coumadin. He is being followed by [facility]. He wants to go home. REVIEW OF SYSTEMS: Denies chest pain, abdominal pain, nausea or vomiting. No fever or chills. OBJECTIVE:      Visit Vitals  BP 99/72   Pulse 69   Temp 99.1 °F (37.3 °C)   Resp 18   Ht 6' 6\" (1.981 m)   Wt 83.5 kg (184 lb)   SpO2 96%   BMI 21.26 kg/m²     Physical Exam:  GEN: Alert, NAD  EYES: conjunctiva normal, lids with out lesions  HEENT: MMM. HEART: RRR +S1 +S2  LUNGS: CTA B/L no rales or rhonchi. ABDOMEN: Soft, non-tender. EXTREMITIES: No edema cyanosis  SKIN: no rashes or skin breakdown, no nodules  NEURO: Alert, oriented x3. Speech is fluent. Cranials: Face is symmetric. Smiles symmetrically. EOMI, VFF. Tongue is midline. Motor: Full strength at all sites. No pronator drift. Sensory: Intact to crude touch on all four. Coordination: Intact with no dysmetria during FNF.      Current Facility-Administered Medications   Medication Dose Route Frequency    levETIRAcetam (KEPPRA) 1500 mg in saline (iso-osm) 100 ml IVPB  1,500 mg IntraVENous Q12H    heparin 25,000 units in D5W 250 ml infusion  18-36 Units/kg/hr IntraVENous TITRATE    morphine injection 2 mg  2 mg IntraVENous Q4H PRN    acetaminophen (TYLENOL) tablet 650 mg  650 mg Oral Q4H PRN    oxyCODONE-acetaminophen (PERCOCET) 5-325 mg per tablet 1-2 Tab  1-2 Tab Oral Q6H PRN    propofol (DIPRIVAN) infusion  0-50 mcg/kg/min IntraVENous CONTINUOUS    ELECTROLYTE REPLACEMENT PROTOCOL - Magnesium  1 Each Other PRN    ELECTROLYTE REPLACEMENT PROTOCOL - Phosphorus  1 Each Other PRN    ELECTROLYTE REPLACEMENT PROTOCOL - Calcium  1 Each Other PRN    pantoprazole (PROTONIX) 40 mg in sodium chloride 0.9% 10 mL injection  40 mg IntraVENous DAILY    midazolam (PF) (VERSED) injection 1 mg  1 mg IntraVENous Q2H PRN    fentaNYL citrate (PF) injection 25 mcg  25 mcg IntraVENous Q4H PRN    albuterol-ipratropium (DUO-NEB) 2.5 MG-0.5 MG/3 ML  3 mL Nebulization Q4H PRN    NOREPINephrine (LEVOPHED) 8 mg in dextrose 5% 250 mL infusion  2-16 mcg/min IntraVENous TITRATE    glucose chewable tablet 16 g  4 Tab Oral PRN    glucagon (GLUCAGEN) injection 1 mg  1 mg IntraMUSCular PRN    dextrose (D50W) injection syrg 12.5-25 g  25-50 mL IntraVENous PRN    0.9% sodium chloride 1,000 mL with mvi, adult no. 4 with vit K 10 mL, thiamine 728 mg, folic acid 1 mg infusion   IntraVENous Q24H    insulin lispro (HUMALOG) injection   SubCUTAneous Q6H    piperacillin-tazobactam (ZOSYN) 3.375 g in 0.9% sodium chloride (MBP/ADV) 100 mL MBP  3.375 g IntraVENous Q8H    dextrose 5 % - 0.45% NaCl infusion  125 mL/hr IntraVENous CONTINUOUS    ELECTROLYTE REPLACEMENT PROTOCOL - Potassium (Renal)  1 Each Other PRN       Laboratory  Recent Results (from the past 24 hour(s))   ECHO ADULT COMPLETE    Collection Time: 05/23/19 10:44 AM   Result Value Ref Range    Aortic Valve Systolic Peak Velocity 69.79 cm/s    AoV VTI 14.77 cm    Aortic Valve Area by Continuity of Peak Velocity 3.0 cm2    Aortic Valve Area by Continuity of VTI 3.0 cm2    AoV PG 2.5 mmHg    LVIDd 4.23 4.2 - 5.9 cm    LVPWd 1.04 (A) 0.6 - 1.0 cm    LVIDs 2.72 cm    IVSd 0.86 0.6 - 1.0 cm    LV ED Vol A2C 52.6 mL    LV ES Vol A4C 41.8 mL    LV ES Vol BP 38.2 22 - 58 mL    LVOT d 1.97 cm    LVOT Peak Velocity 78.28 cm/s    LVOT Peak Gradient 2.5 mmHg    LVOT VTI 14.74 cm    LV E' Septal Velocity 7.00 cm/s    LV E' Lateral Velocity 11.00 cm/s    MVA (PHT) 2.8 cm2    MV A Angelito 43.31 cm/s    MV E Angelito 76.24 cm/s    MV E/A 1.76     RVIDd 4.50 cm    Aortic Valve Systolic Mean Gradient 1.5 mmHg    BP EF 40.4 (A) 55 - 100 %    LV Ejection Fraction MOD 4C 47 %    LV Ejection Fraction MOD 2C 44 %    LA Vol 4C 49.12 18 - 58 mL    LA Vol 2C 68.69 (A) 18 - 58 mL    LV Mass .0 88 - 224 g    LV Mass AL Index 67.5 49 - 115 g/m2    E/E' lateral 6.93     E/E' septal 10.89     TAPSV 2.0 cm/s    IVC proximal 2.06 cm    E/E' ratio (averaged) 8.91     LV ES Vol A2C 29.4 mL    LVES Vol Index BP 17.5 mL/m2    LV ED Vol A4C 78.8 mL    LVED Vol Index BP 29.5 mL/m2    Mitral Valve E Wave Deceleration Time 270.7 ms    Mitral Valve Pressure Half-time 78.5 ms    Left Atrium Major Axis 4.20 cm    LV ED Vol BP 64.2 (A) 67 - 155 ml    LA Vol Index 31.52 16 - 28 ml/m2    LA Vol Index 22.54 16 - 28 ml/m2    LVED Vol Index A4C 36.2 mL/m2    LVED Vol Index A2C 24.1 mL/m2    LVES Vol Index A4C 19.2 mL/m2    LVES Vol Index A2C 13.5 mL/m2    MACKENZIE/BSA Pk Angelito 1.4 cm2/m2    MACKENZIE/BSA VTI 1.4 cm2/m2   CBC WITH AUTOMATED DIFF    Collection Time: 05/23/19  2:00 PM   Result Value Ref Range    WBC 12.5 4.6 - 13.2 K/uL    RBC 3.97 (L) 4.70 - 5.50 M/uL    HGB 11.8 (L) 13.0 - 16.0 g/dL    HCT 36.6 36.0 - 48.0 %    MCV 92.2 74.0 - 97.0 FL    MCH 29.7 24.0 - 34.0 PG    MCHC 32.2 31.0 - 37.0 g/dL    RDW 13.2 11.6 - 14.5 %    PLATELET 204 791 - 143 K/uL    MPV 9.4 9.2 - 11.8 FL    NEUTROPHILS 85 (H) 40 - 73 %    LYMPHOCYTES 7 (L) 21 - 52 %    MONOCYTES 8 3 - 10 %    EOSINOPHILS 0 0 - 5 %    BASOPHILS 0 0 - 2 %    ABS. NEUTROPHILS 10.5 (H) 1.8 - 8.0 K/UL    ABS. LYMPHOCYTES 0.9 0.9 - 3.6 K/UL    ABS. MONOCYTES 1.0 0.05 - 1.2 K/UL    ABS. EOSINOPHILS 0.0 0.0 - 0.4 K/UL    ABS.  BASOPHILS 0.0 0.0 - 0.1 K/UL    DF AUTOMATED     PTT    Collection Time: 05/23/19  2:00 PM   Result Value Ref Range    aPTT 58.1 (H) 23.0 - 36.4 SEC   GLUCOSE, POC    Collection Time: 05/23/19  6:39 PM Result Value Ref Range    Glucose (POC) 117 (H) 70 - 110 mg/dL   EKG, 12 LEAD, INITIAL    Collection Time: 05/23/19  7:54 PM   Result Value Ref Range    Ventricular Rate 82 BPM    Atrial Rate 82 BPM    P-R Interval 192 ms    QRS Duration 96 ms    Q-T Interval 362 ms    QTC Calculation (Bezet) 422 ms    Calculated P Axis 77 degrees    Calculated R Axis -32 degrees    Calculated T Axis 30 degrees    Diagnosis       Normal sinus rhythm  Left axis deviation  Abnormal ECG  When compared with ECG of 22-MAY-2019 14:50,  T wave inversion now evident in Inferior leads     PTT    Collection Time: 05/23/19  8:01 PM   Result Value Ref Range    aPTT >180.0 (HH) 23.0 - 36.4 SEC   CARDIAC PANEL,(CK, CKMB & TROPONIN)    Collection Time: 05/23/19  8:01 PM   Result Value Ref Range    CK 3,280 (H) 39 - 308 U/L    CK - MB 9.4 (H) <3.6 ng/ml    CK-MB Index 0.3 0.0 - 4.0 %    Troponin-I, QT 0.20 (H) 0.0 - 0.045 NG/ML   GLUCOSE, POC    Collection Time: 05/24/19 12:21 AM   Result Value Ref Range    Glucose (POC) 112 (H) 70 - 056 mg/dL   METABOLIC PANEL, COMPREHENSIVE    Collection Time: 05/24/19  4:15 AM   Result Value Ref Range    Sodium 145 136 - 145 mmol/L    Potassium 3.7 3.5 - 5.5 mmol/L    Chloride 113 (H) 100 - 108 mmol/L    CO2 26 21 - 32 mmol/L    Anion gap 6 3.0 - 18 mmol/L    Glucose 95 74 - 99 mg/dL    BUN 11 7.0 - 18 MG/DL    Creatinine 1.14 0.6 - 1.3 MG/DL    BUN/Creatinine ratio 10 (L) 12 - 20      GFR est AA >60 >60 ml/min/1.73m2    GFR est non-AA >60 >60 ml/min/1.73m2    Calcium 7.7 (L) 8.5 - 10.1 MG/DL    Bilirubin, total 0.4 0.2 - 1.0 MG/DL    ALT (SGPT) 47 16 - 61 U/L    AST (SGOT) 132 (H) 15 - 37 U/L    Alk.  phosphatase 69 45 - 117 U/L    Protein, total 5.2 (L) 6.4 - 8.2 g/dL    Albumin 2.6 (L) 3.4 - 5.0 g/dL    Globulin 2.6 2.0 - 4.0 g/dL    A-G Ratio 1.0 0.8 - 1.7     CBC WITH AUTOMATED DIFF    Collection Time: 05/24/19  4:15 AM   Result Value Ref Range    WBC 11.3 4.6 - 13.2 K/uL    RBC 3.46 (L) 4.70 - 5.50 M/uL HGB 10.3 (L) 13.0 - 16.0 g/dL    HCT 31.8 (L) 36.0 - 48.0 %    MCV 91.9 74.0 - 97.0 FL    MCH 29.8 24.0 - 34.0 PG    MCHC 32.4 31.0 - 37.0 g/dL    RDW 13.3 11.6 - 14.5 %    PLATELET 323 (L) 420 - 420 K/uL    MPV 9.4 9.2 - 11.8 FL    NEUTROPHILS 76 (H) 40 - 73 %    LYMPHOCYTES 14 (L) 21 - 52 %    MONOCYTES 8 3 - 10 %    EOSINOPHILS 2 0 - 5 %    BASOPHILS 0 0 - 2 %    ABS. NEUTROPHILS 8.7 (H) 1.8 - 8.0 K/UL    ABS. LYMPHOCYTES 1.6 0.9 - 3.6 K/UL    ABS. MONOCYTES 0.9 0.05 - 1.2 K/UL    ABS. EOSINOPHILS 0.2 0.0 - 0.4 K/UL    ABS. BASOPHILS 0.0 0.0 - 0.1 K/UL    DF AUTOMATED     MAGNESIUM    Collection Time: 05/24/19  4:15 AM   Result Value Ref Range    Magnesium 2.2 1.6 - 2.6 mg/dL   PHOSPHORUS    Collection Time: 05/24/19  4:15 AM   Result Value Ref Range    Phosphorus 2.8 2.5 - 4.9 MG/DL   PTT    Collection Time: 05/24/19  4:20 AM   Result Value Ref Range    aPTT >180.0 (HH) 23.0 - 36.4 SEC   CARDIAC PANEL,(CK, CKMB & TROPONIN)    Collection Time: 05/24/19  4:20 AM   Result Value Ref Range    CK 3,212 (H) 39 - 308 U/L    CK - MB 6.6 (H) <3.6 ng/ml    CK-MB Index 0.2 0.0 - 4.0 %    Troponin-I, QT 0.16 (H) 0.0 - 0.045 NG/ML   CALCIUM, IONIZED    Collection Time: 05/24/19  4:20 AM   Result Value Ref Range    Ionized Calcium 1.13 1.12 - 1.32 MMOL/L   GLUCOSE, POC    Collection Time: 05/24/19  5:05 AM   Result Value Ref Range    Glucose (POC) 113 (H) 70 - 110 mg/dL       Radiology:  Ct Head Wo Cont    Result Date: 5/22/2019  EXAM: CT head INDICATION: Seizure, patient unresponsive. COMPARISON: None. TECHNIQUE: Axial CT imaging of the head was performed without intravenous contrast. One or more dose reduction techniques were used on this CT: automated exposure control, adjustment of the mAs and/or kVp according to patient size, and iterative reconstruction techniques. The specific techniques used on this CT exam have been documented in the patient's electronic medical record.   Digital Imaging and Communications in Medicine (DICOM) format image data are available to nonaffiliated external healthcare facilities or entities on a secure, media free, reciprocally searchable basis with patient authorization for at least a 12-month period after this study. _______________ FINDINGS: BRAIN AND POSTERIOR FOSSA: Cortical sulci volume is within normal limits for patient age. The ventricular size and configuration is normal. Physiologic bilateral basal ganglia calcifications are present. There is no intracranial hemorrhage, mass effect, or midline shift. The gray-white matter differentiation is within normal limits. Periventricular white matter low-attenuation is present. EXTRA-AXIAL SPACES AND MENINGES: There are no abnormal extra-axial fluid collections. CALVARIUM: Intact. SINUSES: Clear. OTHER: None. _______________     1. No acute intracranial abnormality. 2. Mild periventricular white matter low-attenuation; nonspecific finding favored to reflect sequela of chronic ischemic microvascular change. CRITICAL RESULT:  CODE S results called to Dr. Ambrosio Subramanian in the emergency room prior to dictation at 1425 hours on 5/22/2019    Xr Chest Port    Result Date: 5/23/2019  EXAM: XR CHEST PORT CLINICAL INDICATION/HISTORY: intubated -Additional: None COMPARISON: May 22, 2019 TECHNIQUE: Portable frontal view of the chest _______________ FINDINGS: SUPPORT DEVICES: Endotracheal tube with tip projecting approximately 5.9 cm above the juwan. Right IJ central venous catheter with tip projecting over the superior cavoatrial junction. Nasogastric tube below the diaphragm, tip collimated from view. HEART AND MEDIASTINUM: Stable cardiac size and mediastinal contours. LUNGS AND PLEURAL SPACES: Linear areas of opacity at each lung base are present without discrete pneumonic consolidation, pneumothorax, or pleural effusion. BONY THORAX AND SOFT TISSUES: Unremarkable. _______________     1.  Endotracheal tube, visualized nasogastric tube, and right IJ central venous catheter in stable position. 2. Linear areas of basilar atelectasis without superimposed acute radiographic abnormality. Xr Chest Port    Result Date: 5/22/2019  EXAM: XR CHEST PORT CLINICAL INDICATION/HISTORY: seizure -Additional: Intubated COMPARISON: None TECHNIQUE: Frontal view of the chest _______________ FINDINGS: SUPPORT LINES AND TUBES:Endotracheal tube tip is at the thoracic inlet, 8.7 cm above the juwan. HEART AND MEDIASTINUM: Midline cardiac silhouette, normal in size. Unremarkable hilar vascular structures. LUNGS AND PLEURAL SPACES: Linear to discoid left basilar and peripheral right midlung interstitial opacity. No focal consolidation, parenchymal opacity. No pneumothorax or pleural effusion. BONY THORAX AND SOFT TISSUES: No acute or destructive osseous abnormality. _______________     1. ET tube at thoracic inlet, superior aspect of acceptable normal limits. 2. Left basilar and peripheral right midlung interstitial opacities representing atelectasis and/or scarring. No focal consolidation    Nm Lung Perfusion W Vent    Result Date: 5/23/2019  VQ SCAN INDICATION: Respiratory failure. Evaluation for pulmonary embolism. COMPARISON: Chest x-ray - 05/23/2019. SITE OF INJECTION: Right internal jugular catheter DESCRIPTION: After aerosolization of 0.8 mCi 99m Tc-DTPA, ventilatory images of the lungs were obtained in multiple projections. After intravenous administration of 6.6 mCi 99m Tc-MAA, perfusion images of the lungs were obtained in multiple projections. Images are correlated with chest radiographic study which demonstrate no evidence of focal pulmonary infiltrate or pleural effusion. [The distribution of radiopharmaceutical is within normal limits on ventilatory images. There is a moderate wedge-shaped perfusion defect in the lateral left lower lobe and right upper lobe.       Moderate perfusion defects in the right upper and left lower lobe are intermediate probability for pulmonary embolism. 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 The patient is being followed by It is reasonable to increase to 1500 mg twice a day from 1000mg twice a day of home dose. The patient was asked a follow-up with [facility] system as soon   as he is discharged from the hospital. His examination seems to be normal. He possibly had thoughts paralysis. I don't think he needs a brain MRI. PLAN/RECOMMENDATIONS:  1. Increase levetiracetam to 1500 mg twice a day  2. No need for brain MRI. There are no abnormal findings on examination. OK to discharge from hospital from neurology perspective. But he may need to stay due to other medical problems. Neurology will sign off at this time. Please do not hesitate to return with any questions.     Signed:  Tila Clark MD  5/24/2019  10:04 AM

## 2019-05-24 NOTE — CDMP QUERY
Patient with c/o  chest pain. If possible, please document in the progress notes and discharge summary if you are evaluating and/or treating any of the following: 
 
? NSTEMI ? Type 2 MI ? Demand Ischemia MI 
? Unstable Angina 
? Other, please specify ? Undetermined The medical record reflects the following: 
Risk Factors:respiratory failure Clinical Indicators: CP, ck 1,466, ck-mb 12.0, troponin 0.35 Treatment: ekg, .heparin drip Thank-You Codi Soto RN [address] [place] [address] [phone]

## 2019-05-25 ENCOUNTER — APPOINTMENT (OUTPATIENT)
Dept: GENERAL RADIOLOGY | Age: 69
DRG: 100 | End: 2019-05-25
Attending: INTERNAL MEDICINE
Payer: MEDICARE

## 2019-05-25 LAB
ALBUMIN SERPL-MCNC: 3 G/DL (ref 3.4–5)
ALBUMIN/GLOB SERPL: 1.1 {RATIO} (ref 0.8–1.7)
ALP SERPL-CCNC: 68 U/L (ref 45–117)
ALT SERPL-CCNC: 53 U/L (ref 16–61)
ANION GAP SERPL CALC-SCNC: 8 MMOL/L (ref 3–18)
APTT PPP: 68.5 SEC (ref 23–36.4)
APTT PPP: 88.7 SEC (ref 23–36.4)
AST SERPL-CCNC: 142 U/L (ref 15–37)
ATRIAL RATE: 52 BPM
BASOPHILS # BLD: 0 K/UL (ref 0–0.1)
BASOPHILS NFR BLD: 0 % (ref 0–2)
BILIRUB SERPL-MCNC: 0.4 MG/DL (ref 0.2–1)
BUN SERPL-MCNC: 10 MG/DL (ref 7–18)
BUN/CREAT SERPL: 11 (ref 12–20)
CALCIUM SERPL-MCNC: 8.5 MG/DL (ref 8.5–10.1)
CALCULATED P AXIS, ECG09: 74 DEGREES
CALCULATED R AXIS, ECG10: -2 DEGREES
CALCULATED T AXIS, ECG11: 12 DEGREES
CHLORIDE SERPL-SCNC: 110 MMOL/L (ref 100–108)
CO2 SERPL-SCNC: 25 MMOL/L (ref 21–32)
CREAT SERPL-MCNC: 0.92 MG/DL (ref 0.6–1.3)
DIAGNOSIS, 93000: NORMAL
DIFFERENTIAL METHOD BLD: ABNORMAL
EOSINOPHIL # BLD: 0.1 K/UL (ref 0–0.4)
EOSINOPHIL NFR BLD: 1 % (ref 0–5)
ERYTHROCYTE [DISTWIDTH] IN BLOOD BY AUTOMATED COUNT: 13 % (ref 11.6–14.5)
GLOBULIN SER CALC-MCNC: 2.7 G/DL (ref 2–4)
GLUCOSE SERPL-MCNC: 90 MG/DL (ref 74–99)
HCT VFR BLD AUTO: 32.8 % (ref 36–48)
HCYS SERPL-SCNC: 7.7 UMOL/L (ref 3.7–13.9)
HGB BLD-MCNC: 10.7 G/DL (ref 13–16)
LYMPHOCYTES # BLD: 1.4 K/UL (ref 0.9–3.6)
LYMPHOCYTES NFR BLD: 13 % (ref 21–52)
MAGNESIUM SERPL-MCNC: 2 MG/DL (ref 1.6–2.6)
MCH RBC QN AUTO: 30.1 PG (ref 24–34)
MCHC RBC AUTO-ENTMCNC: 32.6 G/DL (ref 31–37)
MCV RBC AUTO: 92.1 FL (ref 74–97)
MONOCYTES # BLD: 0.8 K/UL (ref 0.05–1.2)
MONOCYTES NFR BLD: 7 % (ref 3–10)
NEUTS SEG # BLD: 8.8 K/UL (ref 1.8–8)
NEUTS SEG NFR BLD: 79 % (ref 40–73)
P-R INTERVAL, ECG05: 200 MS
PLATELET # BLD AUTO: 126 K/UL (ref 135–420)
PMV BLD AUTO: 9.4 FL (ref 9.2–11.8)
POTASSIUM SERPL-SCNC: 4.6 MMOL/L (ref 3.5–5.5)
PROT SERPL-MCNC: 5.7 G/DL (ref 6.4–8.2)
Q-T INTERVAL, ECG07: 410 MS
QRS DURATION, ECG06: 94 MS
QTC CALCULATION (BEZET), ECG08: 381 MS
RBC # BLD AUTO: 3.56 M/UL (ref 4.7–5.5)
SODIUM SERPL-SCNC: 143 MMOL/L (ref 136–145)
THROMBIN TIME: >60 SECS (ref 13.8–18.2)
VENTRICULAR RATE, ECG03: 52 BPM
WBC # BLD AUTO: 11.1 K/UL (ref 4.6–13.2)

## 2019-05-25 PROCEDURE — C9113 INJ PANTOPRAZOLE SODIUM, VIA: HCPCS | Performed by: HOSPITALIST

## 2019-05-25 PROCEDURE — 85025 COMPLETE CBC W/AUTO DIFF WBC: CPT

## 2019-05-25 PROCEDURE — 36415 COLL VENOUS BLD VENIPUNCTURE: CPT

## 2019-05-25 PROCEDURE — 80053 COMPREHEN METABOLIC PANEL: CPT

## 2019-05-25 PROCEDURE — 77030011256 HC DRSG MEPILEX <16IN NO BORD MOLN -A

## 2019-05-25 PROCEDURE — 85730 THROMBOPLASTIN TIME PARTIAL: CPT

## 2019-05-25 PROCEDURE — 77030027138 HC INCENT SPIROMETER -A

## 2019-05-25 PROCEDURE — 74011250636 HC RX REV CODE- 250/636: Performed by: INTERNAL MEDICINE

## 2019-05-25 PROCEDURE — 74011250637 HC RX REV CODE- 250/637: Performed by: INTERNAL MEDICINE

## 2019-05-25 PROCEDURE — 97161 PT EVAL LOW COMPLEX 20 MIN: CPT

## 2019-05-25 PROCEDURE — 83735 ASSAY OF MAGNESIUM: CPT

## 2019-05-25 PROCEDURE — 65660000000 HC RM CCU STEPDOWN

## 2019-05-25 PROCEDURE — 74011000250 HC RX REV CODE- 250: Performed by: HOSPITALIST

## 2019-05-25 PROCEDURE — 93005 ELECTROCARDIOGRAM TRACING: CPT

## 2019-05-25 PROCEDURE — 77030037877 HC DRSG MEPILEX >48IN BORD MOLN -A

## 2019-05-25 PROCEDURE — 87449 NOS EACH ORGANISM AG IA: CPT

## 2019-05-25 PROCEDURE — 74011250636 HC RX REV CODE- 250/636: Performed by: HOSPITALIST

## 2019-05-25 RX ORDER — LEVETIRACETAM 500 MG/1
1500 TABLET ORAL 2 TIMES DAILY
Status: DISCONTINUED | OUTPATIENT
Start: 2019-05-25 | End: 2019-05-26 | Stop reason: HOSPADM

## 2019-05-25 RX ORDER — HEPARIN SODIUM 1000 [USP'U]/ML
40 INJECTION, SOLUTION INTRAVENOUS; SUBCUTANEOUS ONCE
Status: COMPLETED | OUTPATIENT
Start: 2019-05-25 | End: 2019-05-25

## 2019-05-25 RX ORDER — OMEPRAZOLE 20 MG/1
20 CAPSULE, DELAYED RELEASE ORAL DAILY
Status: DISCONTINUED | OUTPATIENT
Start: 2019-05-26 | End: 2019-05-26 | Stop reason: HOSPADM

## 2019-05-25 RX ADMIN — LEVETIRACETAM 1500 MG: 500 TABLET ORAL at 21:32

## 2019-05-25 RX ADMIN — HEPARIN SODIUM 13 UNITS/KG/HR: 10000 INJECTION, SOLUTION INTRAVENOUS at 19:25

## 2019-05-25 RX ADMIN — FOLIC ACID: 5 INJECTION, SOLUTION INTRAMUSCULAR; INTRAVENOUS; SUBCUTANEOUS at 12:09

## 2019-05-25 RX ADMIN — AMOXICILLIN AND CLAVULANATE POTASSIUM 1 TABLET: 875; 125 TABLET, FILM COATED ORAL at 21:32

## 2019-05-25 RX ADMIN — SODIUM CHLORIDE 40 MG: 9 INJECTION, SOLUTION INTRAMUSCULAR; INTRAVENOUS; SUBCUTANEOUS at 08:12

## 2019-05-25 RX ADMIN — LEVETIRACETAM 1500 MG: 500 TABLET ORAL at 12:09

## 2019-05-25 RX ADMIN — HEPARIN SODIUM 3340 UNITS: 1000 INJECTION INTRAVENOUS; SUBCUTANEOUS at 03:23

## 2019-05-25 RX ADMIN — AMOXICILLIN AND CLAVULANATE POTASSIUM 1 TABLET: 875; 125 TABLET, FILM COATED ORAL at 08:12

## 2019-05-25 NOTE — ROUTINE PROCESS
56- TRANSFER - IN REPORT: 
 
Verbal report received from [address] [facility] JACQUELINE RN(name) on Dalia Gina IV  being received from ICU(unit) for routine progression of care Report consisted of patients Situation, Background, Assessment and  
Recommendations(SBAR). Information from the following report(s) SBAR, Kardex, MAR, Recent Results and Cardiac Rhythm NSR with 1degree AVB was reviewed with the receiving nurse. Opportunity for questions and clarification was provided. Assessment completed upon patients arrival to unit and care assumed.

## 2019-05-25 NOTE — ROUTINE PROCESS
Bedside and Verbal shift change report given to CHARI Rao R.N. (oncoming nurse) by LYNETTE Murdock R.N. (offgoing nurse). Report included the following information SBAR, Kardex, Intake/Output, MAR, Accordion, Recent Results and Med Rec Status.

## 2019-05-25 NOTE — PROGRESS NOTES
Problem: Mobility Impaired (Adult and Pediatric)  Goal: *Acute Goals and Plan of Care (Insert Text)  Description  No goals necessary at this time as pt demonstrates functional mobility including ambulation with SPC. Outcome: Resolved/Met    PHYSICAL THERAPY EVALUATION & DISCHARGE    Patient: Raquel Spangler IV (79 y.o. male)  Date: 5/25/2019  Primary Diagnosis: Seizure ([facility] [address] [R56.9]  Hyperammonemia ([facility] [address] [E72.20]  Pako's paralysis ([facility] [address] [G83.84]  Precautions: Contact Isolation    ASSESSMENT AND RECOMMENDATIONS:  Based on the objective data described below, the patient presents with ability to perform functional mobility tasks including bed mobility, transfers and gait level surface at supervision/mod I level (S for equipment). Pt lives in ground level apartment with no interior steps. Pt ambulating with SPC using reciprocal gt in room only due to contact isolation (r/o C-Diff). Pt left seated in bedside chair with SO present in room and pt in NAD with all needs in reach. Nurse Aylin Power notified of above. Skilled physical therapy is not indicated at this time. Discharge Recommendations: None  Further Equipment Recommendations for Discharge: N/A      SUBJECTIVE:   Patient stated ? I'm doing better than I was two days ago. ?    OBJECTIVE DATA SUMMARY:   No past medical history on file. No past surgical history on file. Barriers to Learning/Limitations: None  Compensate with: visual, verbal, tactile, kinesthetic cues/model  Prior Level of Function/Home Situation: independent with SPC  Home Situation  Home Environment: Apartment  # Steps to Enter: 0  One/Two Story Residence: One story  Living Alone: No  Support Systems: Spouse/Significant Other/Partner, Friends \ neighbors, [employer] / dave community  Patient Expects to be Discharged to[de-identified] Apartment  Current DME Used/Available at Home: Cane, straight  Critical Behavior:  Neurologic State: Alert; Appropriate for age  Orientation Level: Oriented X4  Cognition: Follows commands; Appropriate decision making; Appropriate safety awareness; Appropriate for age attention/concentration  Safety/Judgement: Awareness of environment; Fall prevention  Psychosocial  Patient Behaviors: Calm; Cooperative  Family  Behaviors: Calm;Supportive  Purposeful Interaction: Yes  Pt Identified Daily Priority: Clinical issues (comment)  Car[facility] Process: Teaching/learning  Caring Interventions: Reassure; Therapeutic modalities  Reassure: Therapeutic listening; Informing  Therapeutic Modalities: Intentional therapeutic touch  Skin Condition/Temp: Dry;Warm  Family  Behaviors: Calm;Supportive  Skin Integrity: Tear;Wound (add Wound LDA)(Left thumb/Sacrum)  Skin Integumentary  Skin Color: Appropriate for ethnicity  Skin Condition/Temp: Dry;Warm  Skin Integrity: Tear;Wound (add Wound LDA)(Left thumb/Sacrum)  Turgor: Non-tenting  Hair Growth: Present  Varicosities: Absent  Strength:    Strength: Within functional limits  Tone & Sensation:   Tone: Normal  Sensation: (though reports feeling numb off and on LE's; R LE today)  Range Of Motion:  AROM: Within functional limits  Functional Mobility:  Bed Mobility:  Rolling: Independent  Supine to Sit: Independent  Sit to Supine: Independent  Scooting: Independent  Transfers:  Sit to Stand: Modified independent  Stand to Sit: Modified independent  Bed to Chair: Modified independent;Supervision  Balance:   Sitting: Intact  Standing: Intact; With support  Ambulation/Gait Training:  Distance (ft): 40 Feet (ft)(in room d/t contact isolation)  Assistive Device: Cane, straight  Ambulation - Level of Assistance: Supervision;Modified independent  Gait Description (WDL): Exceptions to WDL  Pain:  Pain Scale 1: Numeric (0 - 10)  Pain Intensity 1: 0  Activity Tolerance:   Good   Please refer to the flowsheet for vital signs taken during this treatment. After treatment:   ?         Patient left in no apparent distress sitting up in chair  ?          Patient left in no apparent distress in bed  ?         Call bell left within reach  ? Nursing notified  ? Caregiver present-SO  ? Bed alarm activated    COMMUNICATION/EDUCATION:   ?         Fall prevention education was provided and the patient/caregiver indicated understanding. ? Patient/family have participated as able in goal setting and plan of care. ?         Patient/family agree to work toward stated goals and plan of care. ?         Patient understands intent and goals of therapy, but is neutral about his/her participation. ? Patient is unable to participate in goal setting and plan of care.     Thank you for this referral.  Clinton Cancino, PT   Time Calculation: 19 mins

## 2019-05-25 NOTE — PROGRESS NOTES
0712:Received verbal bedside report from off going nurse CHARI Rao R.N. Patient care received. Patient alert and oriented x 4. Patient resting in bed denies pain. Patient stable. Call light with in reach bed in lowest position. 0820:Reported to [facility] patient having frequent loose stools and is not on any stool softeners or laxatives. [facility] gave verbal orders to put in a C. Diff panel. 0845:Stool sample collected and sent down to lab.

## 2019-05-25 NOTE — PROGRESS NOTES
[facility]                  Pulmonary, Critical Care, and Sleep Medicine     Name: Dann Jacob MRN: [id number]   : 1950 Hospital: [facility] [place]    Date: 2019        PCCM Note                                              Consult requesting physician: Dr. Jodi Alvarado  Reason for Consult: ventilator management, seizure    Subjective/History of Present Illness:     Patient is a 79 y.o. male with PMHx significant for ? DVT, epilepsy, found in active seizure by landlord, seizure in ambulance, received ativan, hypoxic due to apnea in ER, intubated in ER. CT head negative. Labile BP. Hypotension requiring vasopressors. 2019   Transferred to medical floor. No overnight respi issues  No CLAIRE wheezing CP leg edema. Tolerating heparin without any bleeding. PVL positive for DVt, on heparin, seen by vascular. No seizure or other neurologic issues  No other overnight issues. No Known Allergies   No past medical history on file. No past surgical history on file. Social History     Tobacco Use    Smoking status: Not on file   Substance Use Topics    Alcohol use: Not on file      No family history on file. Prior to Admission medications    Medication Sig Start Date End Date Taking? Authorizing Provider   levETIRAcetam (KEPPRA) 750 mg tablet Take 1,500 mg by mouth two (2) times a day. Yes Provider, Historical   phenytoin sodium extended (DILANTIN PO) Take 300 mg by mouth two (2) times a day. Yes Provider, Historical   lacosamide (VIMPAT) 100 mg tab tablet Take 100 mg by mouth two (2) times a day.    Yes Provider, Historical     Current Facility-Administered Medications   Medication Dose Route Frequency    levETIRAcetam (KEPPRA) 1500 mg in saline (iso-osm) 100 ml IVPB  1,500 mg IntraVENous Q12H    amoxicillin-clavulanate (AUGMENTIN) 875-125 mg per tablet 1 Tab  1 Tab Oral Q12H    heparin 25,000 units in D5W 250 ml infusion  18-36 Units/kg/hr IntraVENous TITRATE    pantoprazole (PROTONIX) 40 mg in sodium chloride 0.9% 10 mL injection  40 mg IntraVENous DAILY    0.9% sodium chloride 1,000 mL with mvi, adult no. 4 with vit K 10 mL, thiamine 590 mg, folic acid 1 mg infusion   IntraVENous Q24H         Objective:   Vital Signs:    Visit Vitals  /68 (BP 1 Location: Right arm, BP Patient Position: At rest;Supine; Head of bed elevated (Comment degrees))   Pulse 70   Temp 98.4 °F (36.9 °C)   Resp 16   Ht 6' 6\" (1.981 m)   Wt 83.5 kg (184 lb)   SpO2 98%   BMI 21.26 kg/m²       O2 Device: Room air   O2 Flow Rate (L/min): 2 l/min   Temp (24hrs), Av.4 °F (36.9 °C), Min:97.5 °F (36.4 °C), Max:98.9 °F (37.2 °C)       Intake/Output:   Last shift:      No intake/output data recorded. Last 3 shifts:  1901 -  0700  In: 295.8 [I.V.:295.8]  Out: 7787 [Urine:2775]      Intake/Output Summary (Last 24 hours) at 2019 1053  Last data filed at 2019 0643  Gross per 24 hour   Intake 295.76 ml   Output 1375 ml   Net -1079.24 ml       Last 3 Recorded Weights in this Encounter    19 1323 19 1043   Weight: 83.8 kg (184 lb 11.9 oz) 83.5 kg (184 lb)       Ventilator Settings:  Mode Rate Tidal Volume Pressure FiO2 PEEP   Spontaneous   450 ml  7 cm H2O 30 % 5 cm H20     Peak airway pressure: 31 cm H2O    Plateau pressure:     Tidal volume:    Minute ventilation: 9.1 l/min   SPO2 97       Physical Exam:     General/Neurology: aaox3, No focal deficit. Head:   Normocephalic, without obvious abnormality, atraumatic. Eye:   EOM intact, pupils not dilated, no scleral icterus, no pallor, no cyanosis. Neck:   Supple, symmetric. No lymphadenopathy. Trachea midline  Lung: Moderate air entry bilateral equal. No rhonchi. No wheezing. No stridors. Heart:   Regular rate & rhythm. S1 S2 present. No murmur. No JVD. Abdomen:  Soft. NT. ND. +BS. No masses. Extremities:  No pedal edema. No cyanosis. No clubbing. Pulses: 2+ and symmetric in DP. Capillary refill: normal  Lymphatic:  No cervical or supraclavicular palpable lymphadenopathy. Skin:   Color, texture, turgor normal. Left thumb dorsal aberration +      Data:       Recent Results (from the past 24 hour(s))   POTASSIUM    Collection Time: 05/24/19 12:00 PM   Result Value Ref Range    Potassium 3.0 (L) 3.5 - 5.5 mmol/L   CARDIAC PANEL,(CK, CKMB & TROPONIN)    Collection Time: 05/24/19  8:05 PM   Result Value Ref Range    CK 3,398 (H) 39 - 308 U/L    CK - MB 4.5 (H) <3.6 ng/ml    CK-MB Index 0.1 0.0 - 4.0 %    Troponin-I, QT 0.12 (H) 0.0 - 0.045 NG/ML   PTT    Collection Time: 05/24/19  8:05 PM   Result Value Ref Range    aPTT 62.1 (H) 23.0 - 36.4 SEC   PTT    Collection Time: 05/25/19  2:45 AM   Result Value Ref Range    aPTT 68.5 (H) 23.0 - 28.1 SEC   METABOLIC PANEL, COMPREHENSIVE    Collection Time: 05/25/19  2:56 AM   Result Value Ref Range    Sodium 143 136 - 145 mmol/L    Potassium 4.6 3.5 - 5.5 mmol/L    Chloride 110 (H) 100 - 108 mmol/L    CO2 25 21 - 32 mmol/L    Anion gap 8 3.0 - 18 mmol/L    Glucose 90 74 - 99 mg/dL    BUN 10 7.0 - 18 MG/DL    Creatinine 0.92 0.6 - 1.3 MG/DL    BUN/Creatinine ratio 11 (L) 12 - 20      GFR est AA >60 >60 ml/min/1.73m2    GFR est non-AA >60 >60 ml/min/1.73m2    Calcium 8.5 8.5 - 10.1 MG/DL    Bilirubin, total 0.4 0.2 - 1.0 MG/DL    ALT (SGPT) 53 16 - 61 U/L    AST (SGOT) 142 (H) 15 - 37 U/L    Alk.  phosphatase 68 45 - 117 U/L    Protein, total 5.7 (L) 6.4 - 8.2 g/dL    Albumin 3.0 (L) 3.4 - 5.0 g/dL    Globulin 2.7 2.0 - 4.0 g/dL    A-G Ratio 1.1 0.8 - 1.7     CBC WITH AUTOMATED DIFF    Collection Time: 05/25/19  2:56 AM   Result Value Ref Range    WBC 11.1 4.6 - 13.2 K/uL    RBC 3.56 (L) 4.70 - 5.50 M/uL    HGB 10.7 (L) 13.0 - 16.0 g/dL    HCT 32.8 (L) 36.0 - 48.0 %    MCV 92.1 74.0 - 97.0 FL    MCH 30.1 24.0 - 34.0 PG    MCHC 32.6 31.0 - 37.0 g/dL    RDW 13.0 11.6 - 14.5 %    PLATELET 388 (L) 475 - 420 K/uL    MPV 9.4 9.2 - 11.8 FL    NEUTROPHILS 79 (H) 40 - 73 %    LYMPHOCYTES 13 (L) 21 - 52 %    MONOCYTES 7 3 - 10 %    EOSINOPHILS 1 0 - 5 %    BASOPHILS 0 0 - 2 %    ABS. NEUTROPHILS 8.8 (H) 1.8 - 8.0 K/UL    ABS. LYMPHOCYTES 1.4 0.9 - 3.6 K/UL    ABS. MONOCYTES 0.8 0.05 - 1.2 K/UL    ABS. EOSINOPHILS 0.1 0.0 - 0.4 K/UL    ABS. BASOPHILS 0.0 0.0 - 0.1 K/UL    DF AUTOMATED     MAGNESIUM    Collection Time: 05/25/19  2:56 AM   Result Value Ref Range    Magnesium 2.0 1.6 - 2.6 mg/dL   EKG, 12 LEAD, SUBSEQUENT    Collection Time: 05/25/19  5:08 AM   Result Value Ref Range    Ventricular Rate 52 BPM    Atrial Rate 52 BPM    P-R Interval 200 ms    QRS Duration 94 ms    Q-T Interval 410 ms    QTC Calculation (Bezet) 381 ms    Calculated P Axis 74 degrees    Calculated R Axis -2 degrees    Calculated T Axis 12 degrees    Diagnosis       Sinus bradycardia  Incomplete right bundle branch block  Minimal voltage criteria for LVH, may be normal variant  Borderline ECG  When compared with ECG of 23-MAY-2019 19:54,  Vent. rate has decreased BY  30 BPM  Incomplete right bundle branch block is now present  Confirmed by Edwin Childs MD, -- ([id number]) on 5/25/2019 7:53:32 AM     PTT    Collection Time: 05/25/19 10:00 AM   Result Value Ref Range    aPTT 88.7 (H) 23.0 - 36.4 SEC         Chemistry Recent Labs     05/25/19  0256 05/24/19  1200 05/24/19  0415 05/23/19  0551  05/22/19  1339   GLU 90  --  95 107*   < > 140*     --  145 145   < > 148*   K 4.6 3.0* 3.7 4.4   < > 3.2*   *  --  113* 114*   < > 121*   CO2 25  --  26 24   < > 11*   BUN 10  --  11 17   < > 10   CREA 0.92  --  1.14 1.62*   < > 0.77   CA 8.5  --  7.7* 7.9*   < > 5.3*   MG 2.0  --  2.2  --   --  1.6   PHOS  --   --  2.8  --   --   --    AGAP 8  --  6 7   < > 16   BUCR 11*  --  10* 10*   < > 13   AP 68  --  69 82  --  66   TP 5.7*  --  5.2* 5.6*  --  4.2*   ALB 3.0*  --  2.6* 2.8*  --  2.0*   GLOB 2.7  --  2.6 2.8  --  2.2   AGRAT 1.1  --  1.0 1.0  --  0.9    < > = values in this interval not displayed. Lactic Acid Lactic acid   Date Value Ref Range Status   05/22/2019 1.9 0.4 - 2.0 MMOL/L Final     Recent Labs     05/22/19  1850   LAC 1.9        Liver Enzymes Protein, total   Date Value Ref Range Status   05/25/2019 5.7 (L) 6.4 - 8.2 g/dL Final     Albumin   Date Value Ref Range Status   05/25/2019 3.0 (L) 3.4 - 5.0 g/dL Final     Globulin   Date Value Ref Range Status   05/25/2019 2.7 2.0 - 4.0 g/dL Final     A-G Ratio   Date Value Ref Range Status   05/25/2019 1.1 0.8 - 1.7   Final     AST (SGOT)   Date Value Ref Range Status   05/25/2019 142 (H) 15 - 37 U/L Final     Alk.  phosphatase   Date Value Ref Range Status   05/25/2019 68 45 - 117 U/L Final     Recent Labs     05/25/19  0256 05/24/19  0415 05/23/19  0551   TP 5.7* 5.2* 5.6*   ALB 3.0* 2.6* 2.8*   GLOB 2.7 2.6 2.8   AGRAT 1.1 1.0 1.0   SGOT 142* 132* 83*   AP 68 69 82        CBC w/Diff Recent Labs     05/25/19  0256 05/24/19  0415 05/23/19  1400   WBC 11.1 11.3 12.5   RBC 3.56* 3.46* 3.97*   HGB 10.7* 10.3* 11.8*   HCT 32.8* 31.8* 36.6   * 133* 155   GRANS 79* 76* 85*   LYMPH 13* 14* 7*   EOS 1 2 0        Cardiac Enzymes Lab Results   Component Value Date/Time    CPK 3,398 (H) 05/24/2019 08:05 PM    CKMB 4.5 (H) 05/24/2019 08:05 PM    CKND1 0.1 05/24/2019 08:05 PM    TROIQ 0.12 (H) 05/24/2019 08:05 PM        BNP No results found for: BNP, BNPP, XBNPT     Coagulation Recent Labs     05/25/19  1000 05/25/19  0245 05/24/19 2005 05/24/19  1030   PTP  --   --   --  19.2*   INR  --   --   --  1.6*   APTT 88.7* 68.5* 62.1* >180.0*         Thyroid  No results found for: T4, T3U, TSH, TSHEXT, TSHEXT    No results found for: T4     Urinalysis Lab Results   Component Value Date/Time    Color YELLOW 05/22/2019 02:27 PM    Appearance CLEAR 05/22/2019 02:27 PM    Specific gravity 1.018 05/22/2019 02:27 PM    pH (UA) 5.5 05/22/2019 02:27 PM    Protein 300 (A) 05/22/2019 02:27 PM    Glucose NEGATIVE  05/22/2019 02:27 PM    Ketone NEGATIVE  05/22/2019 02:27 PM Bilirubin NEGATIVE  05/22/2019 02:27 PM    Urobilinogen 0.2 05/22/2019 02:27 PM    Nitrites NEGATIVE  05/22/2019 02:27 PM    Leukocyte Esterase NEGATIVE  05/22/2019 02:27 PM    Epithelial cells FEW 05/22/2019 02:27 PM    Bacteria 1+ (A) 05/22/2019 02:27 PM    WBC 0 to 1 05/22/2019 02:27 PM    RBC 0 to 3 05/22/2019 02:27 PM        Micro  Recent Labs     05/22/19  1945 05/22/19  1850 05/22/19  1610   CULT NO GROWTH 3 DAYS NO GROWTH 3 DAYS FEW YEAST*  MANY NORMAL RESPIRATORY ALEJANDRO     Recent Labs     05/22/19  1945 05/22/19  1850 05/22/19  1610   CULT NO GROWTH 3 DAYS NO GROWTH 3 DAYS FEW YEAST*  MANY NORMAL RESPIRATORY ALEJANDRO        ABG Recent Labs     05/23/19  0540 05/22/19  1404   PHI 7.312* 7.127*   PCO2I 40.4 56.1*   PO2I 156* 285*   HCO3I 20.5* 18.6*   FIO2I 40 100          CT (Most Recent) (CT chest reviewed by me) Results from Hospital Encounter encounter on 05/22/19   CT HEAD WO CONT    Narrative EXAM: CT head    INDICATION: Seizure, patient unresponsive. COMPARISON: None. TECHNIQUE: Axial CT imaging of the head was performed without intravenous  contrast.    One or more dose reduction techniques were used on this CT: automated exposure  control, adjustment of the mAs and/or kVp according to patient size, and  iterative reconstruction techniques. The specific techniques used on this CT  exam have been documented in the patient's electronic medical record. Digital  Imaging and Communications in Medicine (DICOM) format image data are available  to nonaffiliated external healthcare facilities or entities on a secure, media  free, reciprocally searchable basis with patient authorization for at least a  12-month period after this study. _______________    FINDINGS:    BRAIN AND POSTERIOR FOSSA: Cortical sulci volume is within normal limits for  patient age. The ventricular size and configuration is normal. Physiologic  bilateral basal ganglia calcifications are present.  There is no intracranial  hemorrhage, mass effect, or midline shift. The gray-white matter differentiation  is within normal limits. Periventricular white matter low-attenuation is  present. EXTRA-AXIAL SPACES AND MENINGES: There are no abnormal extra-axial fluid  collections. CALVARIUM: Intact. SINUSES: Clear. OTHER: None.    _______________      Impression 1. No acute intracranial abnormality. 2. Mild periventricular white matter low-attenuation; nonspecific finding  favored to reflect sequela of chronic ischemic microvascular change. CRITICAL RESULT:  CODE S results called to Dr. Justice Calderón in the emergency room  prior to dictation at 1425 hours on 5/22/2019            XR (Most Recent). CXR  reviewed by me and compared with previous CXR Results from Hospital Encounter encounter on 05/22/19   XR CHEST PORT    Narrative EXAM: XR CHEST PORT    CLINICAL INDICATION/HISTORY: intubated  -Additional: None    COMPARISON: May 22, 2019    TECHNIQUE: Portable frontal view of the chest    _______________    FINDINGS:    SUPPORT DEVICES: Endotracheal tube with tip projecting approximately 5.9 cm  above the juwan. Right IJ central venous catheter with tip projecting over the  superior cavoatrial junction. Nasogastric tube below the diaphragm, tip  collimated from view. HEART AND MEDIASTINUM: Stable cardiac size and mediastinal contours. LUNGS AND PLEURAL SPACES: Linear areas of opacity at each lung base are present  without discrete pneumonic consolidation, pneumothorax, or pleural effusion. BONY THORAX AND SOFT TISSUES: Unremarkable.    _______________      Impression 1. Endotracheal tube, visualized nasogastric tube, and right IJ central venous  catheter in stable position. 2. Linear areas of basilar atelectasis without superimposed acute radiographic  abnormality. EEG 5/23/19:    IMPRESSION: This EEG is abnormal due to generalized slowing and low amplitude background, which is an indicator of diffuse cerebral dysfunction from any cause including -but not limited to- toxic, metabolic and infectious etiologies. Intermittent beta-range waves are likely caused by sedative medications such as benzodiazepine. There are no ictal or interictal findings with a specific correlation with seizures. PVL LE 5/23/19:  · Chronic non-occlusive thrombus present in the right common femoral vein. · Subacute non-occlusive thrombus present in the right profunda femoral vein. · Subacute non-occlusive thrombus present in the right proximal femoral vein. · Subacute non-occlusive thrombus present in the right mid femoral vein. · Acute occlusive thrombus present in the right distal femoral vein. · Subacute non-occlusive thrombus present in the right popliteal vein. · Acute non-occlusive thrombus present in the left common femoral vein. · Acute non-occlusive thrombus present in the left saphenofemoral junction. · Subacute non-occlusive thrombus present in the left profunda femoral vein. · Subacute non-occlusive thrombus present in the left femoral vein. · Subacute non-occlusive present in the left femoral vein. · Subacute non-occlusive thrombus present in the left popliteal vein. Echo 5/23/19:  · Left Ventricle: Mild-to-moderate systolic dysfunction. Estimated left ventricular ejection fraction is 41 - 45%. E/E' ratio is 8.91. .  · Left Atrium: Mildly dilated left atrium. · Aortic Valve: Probably trileaflet aortic valve. · Mitral Valve: Trace mitral valve regurgitation. · Right Ventricle: Mildly dilated right ventricle. · Pulmonary Artery: There is no evidence of pulmonary hypertension. VQ scan 5/23/19:  Moderate perfusion defects in the right upper and left lower lobe are  intermediate probability for pulmonary embolism. Hypercoagulable w/u:   Normal/negative: homocysteine   Other w/u pending result. IMPRESSION:   · Recurrent seizure, with hx of epilepsy (home regimen keppra.  Dilantin stopped recently due to interaction with coumadin)  · Left weakness, ? Pako's paralysis. Resolved. · Respiratory failure / apnea, intubated in ER 5/22/19, extubated 5/23/19  · Possible aspiration pneumonia vs pneumonitis. · B/l LE extensive acute, subacute and chronic DVT. · Intermediate probability for PE on VQ scan. · LVEF 41-45%. · Hypocalcemia  · Hypokalemia, replaced  · Hypernatremia   · Elevated ammonia likely due to seizure, resolved. · Elevated CPK, likely due to seizure  · Mildly abnormal troponin likely due to seizure  · Hx of DVT (not on anticoagulation, reported by spouse)  · THC positive   · Anemia, no bleeding. · Code status: full      RECOMMENDATIONS:   Respiratory: respi status good. No overnight respi issues. Intermediate probability for PE on VQ. Anticoagulation as below. Keep SPO2 >=92%. HOB 30 degree elevation all the time. Aggressive pulmonary toileting. Aspiration precautions. Incentive spirometry. CVS:   Echo with low LVEF, consider cardiology consult. Defer to primary team.   B/l LE PVL positive for acute subacute and chronic DVT. Intermediate probability for PE on VQ. Not sure if he was compliant to coumadin. Hypercoagulable w/u pending results. On heparin drip. Vascular surgeon appreciated. Consider transition to Xarelto. Will need to follow with vascular after d/c. ID: completed augmentin to complete 5 day course for aspiration. Endotracheal aspirate cx many normal reid. Will defer respective systems problem management to primary and other respective consultant   Further recommendations will be based on the patient's response to recommended treatment and results of the investigation ordered. Quality Care: PPI, DVT prophylaxis, HOB elevated, Infection control all reviewed and addressed. Care of plan d/w RN, RT, MDR.   D/w pt updated.      Moderate complexity decision making was performed during the evaluation of this patient at high risk for decompensation with multiple organ involvement. Ok to d/c from pulmonary standpoint. PCCM will sign off, call us with any questions.           Adam Ferrer MD  5/25/2019

## 2019-05-25 NOTE — PROGRESS NOTES
@3281 pt taken over awake alert oriented x4  But impulsive, bed alarm on and pt reminded to call for help when needed, visitors at bedside. Pt remains on room air on heparin drip. Assessment done and documented in appropriate  Flow sheets awaiting room in [facility]. @2103 Heparin bolus administered and drip increased per protocol observation continues. @2130 no changes care continues. @2330 pt reassessed no new developments . Continues to deny pain . Pt had 2 BM assisted with personal hygiene Nursing management continues. @0050 TRANSFER - OUT REPORT:    Verbal report given to Mendel RN(name) on Ana Pond IV  being transferred to [address] [place] [address](unit) for routine progression of care       Report consisted of patients Situation, Background, Assessment and   Recommendations(SBAR). Information from the following report(s) SBAR, Kardex, Intake/Output, MAR, Recent Results, Med Rec Status and Alarm Parameters  was reviewed with the receiving nurse.     Lines:   Peripheral IV 05/22/19 Left Forearm (Active)   Site Assessment Clean, dry, & intact 5/24/2019 11:34 PM   Phlebitis Assessment 0 5/24/2019 11:34 PM   Infiltration Assessment 0 5/24/2019 11:34 PM   Dressing Status Clean, dry, & intact 5/24/2019 11:34 PM   Dressing Type Transparent;Tape 5/24/2019 11:34 PM   Hub Color/Line Status Infusing 5/24/2019 11:34 PM   Action Taken Open ports on tubing capped 5/24/2019 11:34 PM   Alcohol Cap Used Yes 5/24/2019 11:34 PM       Peripheral IV 05/24/19 Right Wrist (Active)   Site Assessment Clean, dry, & intact 5/24/2019 11:34 PM   Phlebitis Assessment 0 5/24/2019 11:34 PM   Infiltration Assessment 0 5/24/2019 11:34 PM   Dressing Status Clean, dry, & intact 5/24/2019 11:34 PM   Dressing Type Transparent;Tape 5/24/2019 11:34 PM   Hub Color/Line Status Infusing 5/24/2019 11:34 PM   Action Taken Open ports on tubing capped 5/24/2019 11:34 PM   Alcohol Cap Used Yes 5/24/2019 11:34 PM        Opportunity for questions and clarification was provided.       Patient transported with:   Monitor  Registered Nurse

## 2019-05-25 NOTE — PROGRESS NOTES
5/25/2019 PT note: consult received and chart reviewed. Evaluation completed and pt demonstrating independence in bed mobility, transfers and gt with SPC in room (limited by contact isolationcurrently). Pt left up in chair with all needs in reach and SO present. Full report to follow. Nurse Dhruv made aware of above. Thank you.    Kenji Aguirre, PT

## 2019-05-25 NOTE — PROGRESS NOTES
Problem: Falls - Risk of  Goal: *Absence of Falls  Description  Document Mya Olivera Fall Risk and appropriate interventions in the flowsheet. Outcome: Progressing Towards Goal     Problem: Patient Education: Go to Patient Education Activity  Goal: Patient/Family Education  Outcome: Progressing Towards Goal     Problem: Patient Education: Go to Patient Education Activity  Goal: Patient/Family Education  Outcome: Progressing Towards Goal     Problem: Pain  Goal: *Control of Pain  Outcome: Progressing Towards Goal     Problem: Patient Education: Go to Patient Education Activity  Goal: Patient/Family Education  Outcome: Progressing Towards Goal     Problem: Risk for Spread of Infection  Goal: Prevent transmission of infectious organism to others  Description  Prevent the transmission of infectious organisms to other patients, staff members, and visitors.   Outcome: Progressing Towards Goal     Problem: Patient Education:  Go to Education Activity  Goal: Patient/Family Education  Outcome: Progressing Towards Goal

## 2019-05-25 NOTE — ROUTINE PROCESS
1836- Monitor tech notified this nurse of change in patient's cardiac rhythm. EKG perform and MD Hubbard notified. New orders received. Patient asymptomatic and NAD noted. Will continue to monitor. 0750-  Purposeful rounding throughout shift, NAD noted at this time and patient is resting quietly in bed. No concerns or requests voiced. Bedside and Verbal shift change report given to Aydee Gaitan RN (oncoming nurse) by Sheela Pappas (offgoing nurse). Report included the following information SBAR, Kardex, MAR and Cardiac Rhythm SR with 1 degree AVB.

## 2019-05-25 NOTE — PROGRESS NOTES
Hospitalist Progress Note    Patient: Lynnette Sarabia MRN: [id number]  CSN: [id number]    YOB: 1950  Age: 79 y.o. Sex: male    DOA: 5/22/2019 LOS:  LOS: 3 days              IMPRESSION and Plan:    Lynnette Sarabia is a 79 y.o. male with   Patient Active Problem List    Diagnosis Date Noted    Seizure ([address] 05/22/2019    Hyperammonemia ([address] 05/22/2019    Pako's paralysis ([address] 05/22/2019    Acute respiratory failure ([address] 05/22/2019    Hypocalcemia 05/22/2019    Hypokalemia 05/22/2019    Aspiration pneumonia ([address] 05/22/2019     Principal Problem:    Acute respiratory failure ([address] (5/22/2019)    Active Problems:    Seizure ([address] (5/22/2019)      Hyperammonemia ([address] (5/22/2019)      Pako's paralysis ([address] (5/22/2019)      Hypocalcemia (5/22/2019)      Hypokalemia (5/22/2019)      Aspiration pneumonia ([address] (5/22/2019)          1. Acute respiratory failure  --improving. 2.  Seizure disorder- change to po Keppra    3. Pako's paralysis left. 4.  Possible sepsis from aspiration   Pneumonia/pneumonitis. -- cont abx    5. Hyperammoniaemia. 6.  AK -- cr is improving    BLE DVT -- will change to Xarelto at DC. Cont IV heparin for now    Diarrhea -- check c.diff    Dispo -- ?? SNF. Await Pti nput    Patient's condition is fair        Recommend to continue hospitalization. Discussed with patient. Chief Complaints:   Chief Complaint   Patient presents with    Unresponsive     SUBJECTIVE:  Pt is seen and examined. Chart reviewed. Feels very weak and co diarrhea              Review of systems:    Review of Systems   Constitutional: Positive for malaise/fatigue. HENT: Negative. Eyes: Negative. Respiratory: Positive for shortness of breath. Cardiovascular: Positive for leg swelling. Negative for chest pain, palpitations and orthopnea. Gastrointestinal: Positive for diarrhea and nausea. Negative for abdominal pain and heartburn.    Genitourinary: Negative for dysuria and hematuria. Skin: Negative. Neurological: Positive for weakness. Psychiatric/Behavioral: Negative for depression, substance abuse and suicidal ideas. The patient is not nervous/anxious. PE:  Patient Vitals for the past 24 hrs:   BP Temp Pulse Resp SpO2   05/25/19 0639 117/68 98.4 °F (36.9 °C) 70 16 98 %   05/25/19 0324 123/75 97.5 °F (36.4 °C) 67 17 99 %   05/25/19 0055 137/87 98.1 °F (36.7 °C) 65 17 98 %   05/24/19 2330 117/65  73 20 97 %   05/24/19 2300 108/68 98.8 °F (37.1 °C) 81 22 97 %   05/24/19 2230 119/65  66 20 97 %   05/24/19 2200 121/78  82 21 95 %   05/24/19 2130 126/73  66 23 95 %   05/24/19 2100 112/60  78 20 95 %   05/24/19 2030 101/82  85 27 96 %   05/24/19 2000 118/79 98.5 °F (36.9 °C) 84 21 97 %   05/24/19 1931 129/68  (!) 104 (!) 32 94 %   05/24/19 1900 121/76  63 17 98 %   05/24/19 1830 117/68  (!) 56 20 100 %   05/24/19 1700 115/71  60 22 100 %   05/24/19 1600 110/74 98.9 °F (37.2 °C) 68 19 99 %   05/24/19 1500 123/74  68 17 100 %   05/24/19 1400 124/79  75 19    05/24/19 1300 115/67  63 20    05/24/19 1200  98.9 °F (37.2 °C)  20 100 %       Intake/Output Summary (Last 24 hours) at 5/25/2019 1119  Last data filed at 5/25/2019 7496  Gross per 24 hour   Intake 295.76 ml   Output 1375 ml   Net -1079.24 ml     Patient Vitals for the past 120 hrs:   Weight   05/22/19 1323 83.8 kg (184 lb 11.9 oz)   05/23/19 1043 83.5 kg (184 lb)         Physical Exam   Constitutional: He is oriented to person, place, and time. He appears distressed. Neck: Normal range of motion. Neck supple. No JVD present. Cardiovascular: Normal rate, regular rhythm and normal heart sounds. Pulmonary/Chest: Breath sounds normal. He is in respiratory distress. Abdominal: Bowel sounds are normal. He exhibits no distension. There is no tenderness. There is no rebound. Musculoskeletal: Normal range of motion. He exhibits no edema.    Neurological: He is alert and oriented to person, place, and time.   Skin: Skin is warm and dry. Psychiatric: Affect and judgment normal.   Nursing note and vitals reviewed. Intake and Output:  Current Shift:  No intake/output data recorded. Last three shifts:  05/23 1901 - 05/25 0700  In: 295.8 [I.V.:295.8]  Out: 2775 [Urine:2775]    Lab/Data Reviewed:  Recent Results (from the past 8 hour(s))   EKG, 12 LEAD, SUBSEQUENT    Collection Time: 05/25/19  5:08 AM   Result Value Ref Range    Ventricular Rate 52 BPM    Atrial Rate 52 BPM    P-R Interval 200 ms    QRS Duration 94 ms    Q-T Interval 410 ms    QTC Calculation (Bezet) 381 ms    Calculated P Axis 74 degrees    Calculated R Axis -2 degrees    Calculated T Axis 12 degrees    Diagnosis       Sinus bradycardia  Incomplete right bundle branch block  Minimal voltage criteria for LVH, may be normal variant  Borderline ECG  When compared with ECG of 23-MAY-2019 19:54,  Vent.  rate has decreased BY  30 BPM  Incomplete right bundle branch block is now present  Confirmed by Gume Vazquez MD, -- ([id number]) on 5/25/2019 7:53:32 AM     PTT    Collection Time: 05/25/19 10:00 AM   Result Value Ref Range    aPTT 88.7 (H) 23.0 - 36.4 SEC     Medications:  Current Facility-Administered Medications   Medication Dose Route Frequency    levETIRAcetam (KEPPRA) 1500 mg in saline (iso-osm) 100 ml IVPB  1,500 mg IntraVENous Q12H    amoxicillin-clavulanate (AUGMENTIN) 875-125 mg per tablet 1 Tab  1 Tab Oral Q12H    heparin 25,000 units in D5W 250 ml infusion  18-36 Units/kg/hr IntraVENous TITRATE    morphine injection 2 mg  2 mg IntraVENous Q4H PRN    acetaminophen (TYLENOL) tablet 650 mg  650 mg Oral Q4H PRN    oxyCODONE-acetaminophen (PERCOCET) 5-325 mg per tablet 1-2 Tab  1-2 Tab Oral Q6H PRN    ELECTROLYTE REPLACEMENT PROTOCOL - Magnesium  1 Each Other PRN    ELECTROLYTE REPLACEMENT PROTOCOL - Phosphorus  1 Each Other PRN    ELECTROLYTE REPLACEMENT PROTOCOL - Calcium  1 Each Other PRN    pantoprazole (PROTONIX) 40 mg in sodium chloride 0.9% 10 mL injection  40 mg IntraVENous DAILY    midazolam (PF) (VERSED) injection 1 mg  1 mg IntraVENous Q2H PRN    fentaNYL citrate (PF) injection 25 mcg  25 mcg IntraVENous Q4H PRN    albuterol-ipratropium (DUO-NEB) 2.5 MG-0.5 MG/3 ML  3 mL Nebulization Q4H PRN    glucose chewable tablet 16 g  4 Tab Oral PRN    glucagon (GLUCAGEN) injection 1 mg  1 mg IntraMUSCular PRN    dextrose (D50W) injection syrg 12.5-25 g  25-50 mL IntraVENous PRN    0.9% sodium chloride 1,000 mL with mvi, adult no. 4 with vit K 10 mL, thiamine 517 mg, folic acid 1 mg infusion   IntraVENous Q24H    ELECTROLYTE REPLACEMENT PROTOCOL - Potassium (Renal)  1 Each Other PRN       Recent Results (from the past 24 hour(s))   POTASSIUM    Collection Time: 05/24/19 12:00 PM   Result Value Ref Range    Potassium 3.0 (L) 3.5 - 5.5 mmol/L   CARDIAC PANEL,(CK, CKMB & TROPONIN)    Collection Time: 05/24/19  8:05 PM   Result Value Ref Range    CK 3,398 (H) 39 - 308 U/L    CK - MB 4.5 (H) <3.6 ng/ml    CK-MB Index 0.1 0.0 - 4.0 %    Troponin-I, QT 0.12 (H) 0.0 - 0.045 NG/ML   PTT    Collection Time: 05/24/19  8:05 PM   Result Value Ref Range    aPTT 62.1 (H) 23.0 - 36.4 SEC   PTT    Collection Time: 05/25/19  2:45 AM   Result Value Ref Range    aPTT 68.5 (H) 23.0 - 21.5 SEC   METABOLIC PANEL, COMPREHENSIVE    Collection Time: 05/25/19  2:56 AM   Result Value Ref Range    Sodium 143 136 - 145 mmol/L    Potassium 4.6 3.5 - 5.5 mmol/L    Chloride 110 (H) 100 - 108 mmol/L    CO2 25 21 - 32 mmol/L    Anion gap 8 3.0 - 18 mmol/L    Glucose 90 74 - 99 mg/dL    BUN 10 7.0 - 18 MG/DL    Creatinine 0.92 0.6 - 1.3 MG/DL    BUN/Creatinine ratio 11 (L) 12 - 20      GFR est AA >60 >60 ml/min/1.73m2    GFR est non-AA >60 >60 ml/min/1.73m2    Calcium 8.5 8.5 - 10.1 MG/DL    Bilirubin, total 0.4 0.2 - 1.0 MG/DL    ALT (SGPT) 53 16 - 61 U/L    AST (SGOT) 142 (H) 15 - 37 U/L    Alk.  phosphatase 68 45 - 117 U/L    Protein, total 5.7 (L) 6.4 - 8.2 g/dL Albumin 3.0 (L) 3.4 - 5.0 g/dL    Globulin 2.7 2.0 - 4.0 g/dL    A-G Ratio 1.1 0.8 - 1.7     CBC WITH AUTOMATED DIFF    Collection Time: 05/25/19  2:56 AM   Result Value Ref Range    WBC 11.1 4.6 - 13.2 K/uL    RBC 3.56 (L) 4.70 - 5.50 M/uL    HGB 10.7 (L) 13.0 - 16.0 g/dL    HCT 32.8 (L) 36.0 - 48.0 %    MCV 92.1 74.0 - 97.0 FL    MCH 30.1 24.0 - 34.0 PG    MCHC 32.6 31.0 - 37.0 g/dL    RDW 13.0 11.6 - 14.5 %    PLATELET 726 (L) 538 - 420 K/uL    MPV 9.4 9.2 - 11.8 FL    NEUTROPHILS 79 (H) 40 - 73 %    LYMPHOCYTES 13 (L) 21 - 52 %    MONOCYTES 7 3 - 10 %    EOSINOPHILS 1 0 - 5 %    BASOPHILS 0 0 - 2 %    ABS. NEUTROPHILS 8.8 (H) 1.8 - 8.0 K/UL    ABS. LYMPHOCYTES 1.4 0.9 - 3.6 K/UL    ABS. MONOCYTES 0.8 0.05 - 1.2 K/UL    ABS. EOSINOPHILS 0.1 0.0 - 0.4 K/UL    ABS. BASOPHILS 0.0 0.0 - 0.1 K/UL    DF AUTOMATED     MAGNESIUM    Collection Time: 05/25/19  2:56 AM   Result Value Ref Range    Magnesium 2.0 1.6 - 2.6 mg/dL   EKG, 12 LEAD, SUBSEQUENT    Collection Time: 05/25/19  5:08 AM   Result Value Ref Range    Ventricular Rate 52 BPM    Atrial Rate 52 BPM    P-R Interval 200 ms    QRS Duration 94 ms    Q-T Interval 410 ms    QTC Calculation (Bezet) 381 ms    Calculated P Axis 74 degrees    Calculated R Axis -2 degrees    Calculated T Axis 12 degrees    Diagnosis       Sinus bradycardia  Incomplete right bundle branch block  Minimal voltage criteria for LVH, may be normal variant  Borderline ECG  When compared with ECG of 23-MAY-2019 19:54,  Vent.  rate has decreased BY  30 BPM  Incomplete right bundle branch block is now present  Confirmed by Adarsh Marroquin MD, -- ([id number]) on 5/25/2019 7:53:32 AM     PTT    Collection Time: 05/25/19 10:00 AM   Result Value Ref Range    aPTT 88.7 (H) 23.0 - 36.4 SEC       Procedures/imaging: see electronic medical records for all procedures/Xrays and details which were not copied into this note but were reviewed prior to creation of Fabiana Warren MD   5/25/2019, 11:19 AM

## 2019-05-26 ENCOUNTER — APPOINTMENT (OUTPATIENT)
Dept: GENERAL RADIOLOGY | Age: 69
DRG: 100 | End: 2019-05-26
Attending: INTERNAL MEDICINE
Payer: MEDICARE

## 2019-05-26 VITALS
WEIGHT: 193 LBS | BODY MASS INDEX: 22.33 KG/M2 | SYSTOLIC BLOOD PRESSURE: 117 MMHG | HEART RATE: 63 BPM | HEIGHT: 78 IN | DIASTOLIC BLOOD PRESSURE: 65 MMHG | OXYGEN SATURATION: 99 % | TEMPERATURE: 98.3 F | RESPIRATION RATE: 16 BRPM

## 2019-05-26 LAB
ALBUMIN SERPL-MCNC: 2.9 G/DL (ref 3.4–5)
ALBUMIN/GLOB SERPL: 1 {RATIO} (ref 0.8–1.7)
ALP SERPL-CCNC: 72 U/L (ref 45–117)
ALT SERPL-CCNC: 58 U/L (ref 16–61)
ANION GAP SERPL CALC-SCNC: 10 MMOL/L (ref 3–18)
APTT PPP: 42.9 SEC (ref 23–36.4)
AST SERPL-CCNC: 117 U/L (ref 15–37)
BASOPHILS # BLD: 0 K/UL (ref 0–0.1)
BASOPHILS NFR BLD: 0 % (ref 0–2)
BILIRUB SERPL-MCNC: 0.6 MG/DL (ref 0.2–1)
BUN SERPL-MCNC: 8 MG/DL (ref 7–18)
BUN/CREAT SERPL: 10 (ref 12–20)
C DIFF GDH STL QL: NEGATIVE
C DIFF TOX A+B STL QL IA: NEGATIVE
CALCIUM SERPL-MCNC: 8.4 MG/DL (ref 8.5–10.1)
CHLORIDE SERPL-SCNC: 107 MMOL/L (ref 100–108)
CO2 SERPL-SCNC: 25 MMOL/L (ref 21–32)
CREAT SERPL-MCNC: 0.8 MG/DL (ref 0.6–1.3)
DIFFERENTIAL METHOD BLD: ABNORMAL
EOSINOPHIL # BLD: 0.1 K/UL (ref 0–0.4)
EOSINOPHIL NFR BLD: 0 % (ref 0–5)
ERYTHROCYTE [DISTWIDTH] IN BLOOD BY AUTOMATED COUNT: 13 % (ref 11.6–14.5)
GLOBULIN SER CALC-MCNC: 2.8 G/DL (ref 2–4)
GLUCOSE SERPL-MCNC: 92 MG/DL (ref 74–99)
HCT VFR BLD AUTO: 33.1 % (ref 36–48)
HGB BLD-MCNC: 10.9 G/DL (ref 13–16)
INTERPRETATION: NORMAL
LYMPHOCYTES # BLD: 1.1 K/UL (ref 0.9–3.6)
LYMPHOCYTES NFR BLD: 9 % (ref 21–52)
MAGNESIUM SERPL-MCNC: 1.6 MG/DL (ref 1.6–2.6)
MCH RBC QN AUTO: 29.8 PG (ref 24–34)
MCHC RBC AUTO-ENTMCNC: 32.9 G/DL (ref 31–37)
MCV RBC AUTO: 90.4 FL (ref 74–97)
MONOCYTES # BLD: 1.1 K/UL (ref 0.05–1.2)
MONOCYTES NFR BLD: 10 % (ref 3–10)
NEUTS SEG # BLD: 9.1 K/UL (ref 1.8–8)
NEUTS SEG NFR BLD: 81 % (ref 40–73)
PHOSPHATE SERPL-MCNC: 3.3 MG/DL (ref 2.5–4.9)
PLATELET # BLD AUTO: 111 K/UL (ref 135–420)
PMV BLD AUTO: 11.2 FL (ref 9.2–11.8)
POTASSIUM SERPL-SCNC: 4.2 MMOL/L (ref 3.5–5.5)
PROT SERPL-MCNC: 5.7 G/DL (ref 6.4–8.2)
RBC # BLD AUTO: 3.66 M/UL (ref 4.7–5.5)
SODIUM SERPL-SCNC: 142 MMOL/L (ref 136–145)
WBC # BLD AUTO: 11.3 K/UL (ref 4.6–13.2)

## 2019-05-26 PROCEDURE — 77030037877 HC DRSG MEPILEX >48IN BORD MOLN -A

## 2019-05-26 PROCEDURE — 83735 ASSAY OF MAGNESIUM: CPT

## 2019-05-26 PROCEDURE — 74011250637 HC RX REV CODE- 250/637: Performed by: INTERNAL MEDICINE

## 2019-05-26 PROCEDURE — 74011250636 HC RX REV CODE- 250/636: Performed by: HOSPITALIST

## 2019-05-26 PROCEDURE — 84100 ASSAY OF PHOSPHORUS: CPT

## 2019-05-26 PROCEDURE — 85730 THROMBOPLASTIN TIME PARTIAL: CPT

## 2019-05-26 PROCEDURE — 74011000250 HC RX REV CODE- 250: Performed by: HOSPITALIST

## 2019-05-26 PROCEDURE — 80053 COMPREHEN METABOLIC PANEL: CPT

## 2019-05-26 PROCEDURE — 36415 COLL VENOUS BLD VENIPUNCTURE: CPT

## 2019-05-26 PROCEDURE — 85025 COMPLETE CBC W/AUTO DIFF WBC: CPT

## 2019-05-26 RX ORDER — LEVETIRACETAM 750 MG/1
1500 TABLET ORAL 2 TIMES DAILY
Qty: 60 TAB | Refills: 0 | Status: SHIPPED | OUTPATIENT
Start: 2019-05-26

## 2019-05-26 RX ORDER — HEPARIN SODIUM 1000 [USP'U]/ML
80 INJECTION, SOLUTION INTRAVENOUS; SUBCUTANEOUS ONCE
Status: COMPLETED | OUTPATIENT
Start: 2019-05-26 | End: 2019-05-26

## 2019-05-26 RX ORDER — AMOXICILLIN AND CLAVULANATE POTASSIUM 875; 125 MG/1; MG/1
1 TABLET, FILM COATED ORAL EVERY 12 HOURS
Qty: 14 TAB | Refills: 0 | Status: SHIPPED | OUTPATIENT
Start: 2019-05-26

## 2019-05-26 RX ORDER — OMEPRAZOLE 20 MG/1
20 CAPSULE, DELAYED RELEASE ORAL DAILY
Qty: 30 CAP | Refills: 0 | Status: SHIPPED | OUTPATIENT
Start: 2019-05-27

## 2019-05-26 RX ADMIN — RIVAROXABAN 15 MG: 15 TABLET, FILM COATED ORAL at 10:21

## 2019-05-26 RX ADMIN — OMEPRAZOLE 20 MG: 20 CAPSULE, DELAYED RELEASE ORAL at 09:39

## 2019-05-26 RX ADMIN — FOLIC ACID: 5 INJECTION, SOLUTION INTRAMUSCULAR; INTRAVENOUS; SUBCUTANEOUS at 09:38

## 2019-05-26 RX ADMIN — HEPARIN SODIUM 7000 UNITS: 1000 INJECTION, SOLUTION INTRAVENOUS; SUBCUTANEOUS at 05:30

## 2019-05-26 RX ADMIN — AMOXICILLIN AND CLAVULANATE POTASSIUM 1 TABLET: 875; 125 TABLET, FILM COATED ORAL at 09:39

## 2019-05-26 RX ADMIN — LEVETIRACETAM 1500 MG: 500 TABLET ORAL at 09:39

## 2019-05-26 NOTE — ROUTINE PROCESS
1925- Assumed patient care from off going nurse Honey Varela. Patient resting quietly in bed at this time, NAD noted, bed is in lowest position, and call light within reach. Will continue to monitor. 0736-Patient resting quietly in bed at this time, purposeful hourly rounding throughout shift, NAD noted at this time, and patient remained stable. No concerns or requests voiced. Bedside and Verbal shift change report given to Mark Rose RN (oncoming nurse) by Beau Murguia (offgoing nurse). Report included the following information SBAR, Kardex, MAR, Accordion and Recent Results.

## 2019-05-26 NOTE — DISCHARGE SUMMARY
Discharge Summary    Patient: Eugenio Garrett MRN: [id number]  CSN: [id number]    YOB: 1950  Age: 79 y.o. Sex: male    DOA: 5/22/2019 LOS:  LOS: 4 days   Discharge Date:      Primary Care Provider:  Other, MD Naveen    Admission Diagnoses: Seizure ([facility] [R56.9]  Hyperammonemia ([facility] [E72.20]  Pako's paralysis ([facility] [G83.84]    Discharge Diagnoses:    Problem List as of 5/26/2019 Never Reviewed          Codes Class Noted - Resolved    Seizure ([facility] ICD-10-CM: R56.9  ICD-9-CM: 780.39  5/22/2019 - Present        Hyperammonemia ([facility] ICD-10-CM: E72.20  ICD-9-CM: 270.6  5/22/2019 - Present        Pako's paralysis ([facility] ICD-10-CM: D00.61  ICD-9-CM: 342.90  5/22/2019 - Present        * (Principal) Acute respiratory failure ([facility] ICD-10-CM: J96.00  ICD-9-CM: 518.81  5/22/2019 - Present        Hypocalcemia ICD-10-CM: E83.51  ICD-9-CM: 275.41  5/22/2019 - Present        Hypokalemia ICD-10-CM: E87.6  ICD-9-CM: 276.8  5/22/2019 - Present        Aspiration pneumonia ([facility] ICD-10-CM: J69.0  ICD-9-CM: 507.0  5/22/2019 - Present              Discharge Medications:     Current Discharge Medication List      START taking these medications    Details   rivaroxaban (XARELTO) 15 mg (42)- 20 mg (9) DsPk Take one 15 mg tablet twice a day with food for the first 21 days. Then, take one 20 mg tablet once a day with food for 9 days. Qty: 1 Dose Pack, Refills: 0      omeprazole (PRILOSEC) 20 mg capsule Take 1 Cap by mouth daily. Qty: 30 Cap, Refills: 0      amoxicillin-clavulanate (AUGMENTIN) 875-125 mg per tablet Take 1 Tab by mouth every twelve (12) hours. Qty: 14 Tab, Refills: 0         CONTINUE these medications which have CHANGED    Details   levETIRAcetam (KEPPRA) 750 mg tablet Take 2 Tabs by mouth two (2) times a day.   Qty: 60 Tab, Refills: 0         STOP taking these medications       phenytoin sodium extended (DILANTIN PO) Comments:   Reason for Stopping:         lacosamide (VIMPAT) 100 mg tab tablet Comments:   Reason for Stopping:               Discharge Condition: Good    Procedures :      Consults: Pulmonary/Critical Care      PHYSICAL EXAM    Visit Vitals  /65   Pulse 63   Temp 98.3 °F (36.8 °C)   Resp 16   Ht 6' 6\" (1.981 m)   Wt 87.5 kg (193 lb)   SpO2 99%   BMI 22.30 kg/m²     General: Awake, cooperative, no acute distress    HEENT: NC, Atraumatic. PERRLA, EOMI. Anicteric sclerae. Lungs:  CTA Bilaterally. No Wheezing/Rhonchi/Rales. Heart:  Regular  rhythm,  No murmur, No Rubs, No Gallops  Abdomen: Soft, Non distended, Non tender. +Bowel sounds,   Extremities: No c/c/e  Psych:   Not anxious or agitated. Neurologic:  No acute neurological deficits. Admission HPI :      HISTORY OF PRESENT ILLNESS:  This is a 72-year-old -American gentleman who was noted to have a seizure outside today. It is uncertain where he actually was when paramedics were called. He received Ativan which stopped his seizures. However, on arrival, the patient was noted to be hypoxic at 86% on a non-rebreather with minimal responsiveness. He was unable to verbalize history. By the time he reached the emergency room, he had inability to protect his airway safely and thus was intubated. There was concern about left-sided flaccid paralysis. He had a CT scan done, a code S for stroke was called. CT scan came back unremarkable. Glucose was in the 200s. The patient was hypertensive, however, then became hypotensive. Fluid resuscitation helped normalize his blood pressure. The patient is now intubated and sedated and unable to give any other history. There are three bottles of medications, all appeared to be seizure medicines at the bedside. There are no family members currently there. I do not know about his other medical history at this point in time.   All information is garnered from the chart and from what the ER learned about him also.     As far as Care Everywhere, there is a clinical summary that notes his medications to be Dilantin and Keppra, his past medical history to include erectile dysfunction and seizures. His family history includes mother and father as well as a sister, all of whom are  from cancer. Social history, current everyday smoker, unknown how many per day. No drug use, although he is positive for THC here and there is a note about alcohol use as well.     He did receive a banana bag in the emergency room. His code status is full. His allergies are not known at this time, also what it looks like from his clinical summary at [facility], there is none noted there. It looks like his emergency contact is Adryan Quiroga, [phone] and there is no PCP listed. Hospital Course :    pt is seen and examined. Feels much better and stronger and ambulating very well. Okay to dc home with [facility]. Arrangements being made to make sure he gets Xarelto. 1.  Acute respiratory failure  --improving.      2.  Seizure disorder- change to po Keppra     3.  Pako's paralysis left.     4.  Possible sepsis from aspiration   Pneumonia/pneumonitis. -- cont abx     5. Hyperammoniaemia.     6.  AK -- cr is improving     BLE DVT --  Will chage to Xarelto started pack than 20mg po qday. He is to fu with his PCP in 2-3 days    Diarrhea --  resolved     Dispo -- home with [facility].  Cleared by PT to be dc          Activity: Activity as tolerated    Diet: Cardiac Diet    Follow-up:      Disposition:  Home hhC     Minutes spent on discharge:  60      Labs: Results:       Chemistry Recent Labs     19  0412 19  0256 19  1200 19  0415   GLU 92 90  --  95    143  --  145   K 4.2 4.6 3.0* 3.7    110*  --  113*   CO2 25 25  --  26   BUN 8 10  --  11   CREA 0.80 0.92  --  1.14   CA 8.4* 8.5  --  7.7*   AGAP 10 8  --  6   BUCR 10* 11*  --  10*   AP 72 68  --  69   TP 5.7* 5.7*  --  5.2*   ALB 2.9* 3.0*  --  2.6*   GLOB 2.8 2.7  --  2.6   AGRAT 1.0 1.1  --  1.0 CBC w/Diff Recent Labs     05/26/19 0412 05/25/19  0256 05/24/19 0415   WBC 11.3 11.1 11.3   RBC 3.66* 3.56* 3.46*   HGB 10.9* 10.7* 10.3*   HCT 33.1* 32.8* 31.8*   * 126* 133*   GRANS 81* 79* 76*   LYMPH 9* 13* 14*   EOS 0 1 2      Cardiac Enzymes Recent Labs     05/24/19 2005 05/24/19  0420   CPK 3,398* 3,212*   CKND1 0.1 0.2      Coagulation Recent Labs     05/26/19 0412 05/25/19  1000  05/24/19  1030   PTP  --   --   --  19.2*   INR  --   --   --  1.6*   APTT 42.9* 88.7*   < > >180.0*    < > = values in this interval not displayed. Lipid Panel No results found for: CHOL, CHOLPOCT, CHOLX, CHLST, CHOLV, 426678, HDL, LDL, LDLC, DLDLP, 216346, VLDLC, VLDL, TGLX, TRIGL, TRIGP, TGLPOCT, CHHD, CHHDX   BNP No results for input(s): BNPP in the last 72 hours. Liver Enzymes Recent Labs     05/26/19 0412   TP 5.7*   ALB 2.9*   AP 72   SGOT 117*      Thyroid Studies No results found for: T4, T3U, TSH, TSHEXT         Significant Diagnostic Studies: Ct Head Wo Cont    Result Date: 5/22/2019  EXAM: CT head INDICATION: Seizure, patient unresponsive. COMPARISON: None. TECHNIQUE: Axial CT imaging of the head was performed without intravenous contrast. One or more dose reduction techniques were used on this CT: automated exposure control, adjustment of the mAs and/or kVp according to patient size, and iterative reconstruction techniques. The specific techniques used on this CT exam have been documented in the patient's electronic medical record. Digital Imaging and Communications in Medicine (DICOM) format image data are available to nonaffiliated external healthcare facilities or entities on a secure, media free, reciprocally searchable basis with patient authorization for at least a 12-month period after this study. _______________ FINDINGS: BRAIN AND POSTERIOR FOSSA: Cortical sulci volume is within normal limits for patient age.  The ventricular size and configuration is normal. Physiologic bilateral basal ganglia calcifications are present. There is no intracranial hemorrhage, mass effect, or midline shift. The gray-white matter differentiation is within normal limits. Periventricular white matter low-attenuation is present. EXTRA-AXIAL SPACES AND MENINGES: There are no abnormal extra-axial fluid collections. CALVARIUM: Intact. SINUSES: Clear. OTHER: None. _______________     1. No acute intracranial abnormality. 2. Mild periventricular white matter low-attenuation; nonspecific finding favored to reflect sequela of chronic ischemic microvascular change. CRITICAL RESULT:  CODE S results called to Dr. Blas Gutierrez in the emergency room prior to dictation at 1425 hours on 5/22/2019    Xr Chest Port    Result Date: 5/23/2019  EXAM: XR CHEST PORT CLINICAL INDICATION/HISTORY: intubated -Additional: None COMPARISON: May 22, 2019 TECHNIQUE: Portable frontal view of the chest _______________ FINDINGS: SUPPORT DEVICES: Endotracheal tube with tip projecting approximately 5.9 cm above the juwan. Right IJ central venous catheter with tip projecting over the superior cavoatrial junction. Nasogastric tube below the diaphragm, tip collimated from view. HEART AND MEDIASTINUM: Stable cardiac size and mediastinal contours. LUNGS AND PLEURAL SPACES: Linear areas of opacity at each lung base are present without discrete pneumonic consolidation, pneumothorax, or pleural effusion. BONY THORAX AND SOFT TISSUES: Unremarkable. _______________     1. Endotracheal tube, visualized nasogastric tube, and right IJ central venous catheter in stable position. 2. Linear areas of basilar atelectasis without superimposed acute radiographic abnormality. Xr Chest Port    Result Date: 5/22/2019  EXAM: XR CHEST PORT CLINICAL INDICATION/HISTORY: seizure -Additional: Intubated COMPARISON: None TECHNIQUE: Frontal view of the chest _______________ FINDINGS: SUPPORT LINES AND TUBES:Endotracheal tube tip is at the thoracic inlet, 8.7 cm above the juwan. HEART AND MEDIASTINUM: Midline cardiac silhouette, normal in size. Unremarkable hilar vascular structures. LUNGS AND PLEURAL SPACES: Linear to discoid left basilar and peripheral right midlung interstitial opacity. No focal consolidation, parenchymal opacity. No pneumothorax or pleural effusion. BONY THORAX AND SOFT TISSUES: No acute or destructive osseous abnormality. _______________     1. ET tube at thoracic inlet, superior aspect of acceptable normal limits. 2. Left basilar and peripheral right midlung interstitial opacities representing atelectasis and/or scarring. No focal consolidation    Nm Lung Perfusion W Vent    Result Date: 5/23/2019  VQ SCAN INDICATION: Respiratory failure. Evaluation for pulmonary embolism. COMPARISON: Chest x-ray - 05/23/2019. SITE OF INJECTION: Right internal jugular catheter DESCRIPTION: After aerosolization of 0.8 mCi 99m Tc-DTPA, ventilatory images of the lungs were obtained in multiple projections. After intravenous administration of 6.6 mCi 99m Tc-MAA, perfusion images of the lungs were obtained in multiple projections. Images are correlated with chest radiographic study which demonstrate no evidence of focal pulmonary infiltrate or pleural effusion. [The distribution of radiopharmaceutical is within normal limits on ventilatory images. There is a moderate wedge-shaped perfusion defect in the lateral left lower lobe and right upper lobe. Moderate perfusion defects in the right upper and left lower lobe are intermediate probability for pulmonary embolism. No results found for this or any previous visit.         CC: Other, MD Naveen

## 2019-05-26 NOTE — PROGRESS NOTES
Problem: Falls - Risk of  Goal: *Absence of Falls  Description  Document Nixon Saldaña Fall Risk and appropriate interventions in the flowsheet. Outcome: Progressing Towards Goal     Problem: Patient Education: Go to Patient Education Activity  Goal: Patient/Family Education  Outcome: Progressing Towards Goal     Problem: Pressure Injury - Risk of  Goal: *Prevention of pressure injury  Description  Document Aquilino Scale and appropriate interventions in the flowsheet.   Outcome: Progressing Towards Goal     Problem: Patient Education: Go to Patient Education Activity  Goal: Patient/Family Education  Outcome: Progressing Towards Goal     Problem: Pain  Goal: *Control of Pain  Outcome: Progressing Towards Goal     Problem: Patient Education: Go to Patient Education Activity  Goal: Patient/Family Education  Outcome: Progressing Towards Goal     Problem: Patient Education: Go to Patient Education Activity  Goal: Patient/Family Education  Outcome: Progressing Towards Goal

## 2019-05-26 NOTE — PROGRESS NOTES
[facility]                  Pulmonary, Critical Care, and Sleep Medicine     Name: Coretta Yates MRN: [id number]   : 1950 Hospital: [facility]    Date: 2019        PCCM Note                                              Consult requesting physician: Dr. Pio Slaughter  Reason for Consult: ventilator management, seizure    Subjective/History of Present Illness:     Patient is a 79 y.o. male with PMHx significant for ? DVT, epilepsy, found in active seizure by landlord, seizure in ambulance, received ativan, hypoxic due to apnea in ER, intubated in ER. CT head negative. Labile BP. Hypotension requiring vasopressors. 2019   No respi issues overnight. No CLAIRE wheezing CP leg edema. Tolerating heparin without any bleeding. PVL positive for DVT, on heparin, seen by vascular. No seizure or other neurologic issues  No other overnight issues. No Known Allergies   No past medical history on file. No past surgical history on file. Social History     Tobacco Use    Smoking status: Not on file   Substance Use Topics    Alcohol use: Not on file      No family history on file. Prior to Admission medications    Medication Sig Start Date End Date Taking? Authorizing Provider   levETIRAcetam (KEPPRA) 750 mg tablet Take 2 Tabs by mouth two (2) times a day. 19  Yes Gila Orlando MD   rivaroxaban (XARELTO) 15 mg (42)- 20 mg (9) DsPk Take one 15 mg tablet twice a day with food for the first 21 days. Then, take one 20 mg tablet once a day with food for 9 days. 19  Yes Gila Orlando MD   omeprazole (PRILOSEC) 20 mg capsule Take 1 Cap by mouth daily. 19  Yes Gila Orlando MD   amoxicillin-clavulanate (AUGMENTIN) 875-125 mg per tablet Take 1 Tab by mouth every twelve (12) hours. 19  Yes Gila Orlando MD   levETIRAcetam (KEPPRA) 750 mg tablet Take 1,500 mg by mouth two (2) times a day.    Yes Provider, Historical   phenytoin sodium extended (DILANTIN PO) Take 300 mg by mouth two (2) times a day. Yes Provider, Historical   lacosamide (VIMPAT) 100 mg tab tablet Take 100 mg by mouth two (2) times a day. Yes Provider, Historical     Current Facility-Administered Medications   Medication Dose Route Frequency    rivaroxaban (XARELTO) tablet 15 mg  15 mg Oral BID WITH MEALS    levETIRAcetam (KEPPRA) tablet 1,500 mg  1,500 mg Oral BID    omeprazole (PRILOSEC) capsule 20 mg  20 mg Oral DAILY    amoxicillin-clavulanate (AUGMENTIN) 875-125 mg per tablet 1 Tab  1 Tab Oral Q12H    0.9% sodium chloride 1,000 mL with mvi, adult no. 4 with vit K 10 mL, thiamine 383 mg, folic acid 1 mg infusion   IntraVENous Q24H         Objective:   Vital Signs:    Visit Vitals  /65   Pulse 63   Temp 98.3 °F (36.8 °C)   Resp 16   Ht 6' 6\" (1.981 m)   Wt 87.5 kg (193 lb)   SpO2 99%   BMI 22.30 kg/m²       O2 Device: Room air   O2 Flow Rate (L/min): 2 l/min   Temp (24hrs), Av.7 °F (37.1 °C), Min:98.3 °F (36.8 °C), Max:99.5 °F (37.5 °C)       Intake/Output:   Last shift:       0701 - 1900  In: 240 [P.O.:240]  Out: -     Last 3 shifts: 1901 -  0700  In: 1379.7 [P.O.:800; I.V.:579.7]  Out: 1375 [Urine:1375]      Intake/Output Summary (Last 24 hours) at 2019 1056  Last data filed at 2019 0830  Gross per 24 hour   Intake 1083.95 ml   Output 800 ml   Net 283.95 ml       Last 3 Recorded Weights in this Encounter    19 1323 19 1043 19 0450   Weight: 83.8 kg (184 lb 11.9 oz) 83.5 kg (184 lb) 87.5 kg (193 lb)       Ventilator Settings:  Mode Rate Tidal Volume Pressure FiO2 PEEP   Spontaneous   450 ml  7 cm H2O 30 % 5 cm H20     Peak airway pressure: 31 cm H2O    Plateau pressure:     Tidal volume:    Minute ventilation: 9.1 l/min   SPO2 97       Physical Exam:     General/Neurology: aaox3, No focal deficit. Head:   Normocephalic, without obvious abnormality, atraumatic.   Eye:   EOM intact, pupils not dilated, no scleral icterus, no pallor, no cyanosis. Neck:   Supple, symmetric. No lymphadenopathy. Trachea midline  Lung: Moderate air entry bilateral equal. No rhonchi. No wheezing. No stridors. Heart:   Regular rate & rhythm. S1 S2 present. No murmur. No JVD. Abdomen:  Soft. NT. ND. +BS. No masses. Extremities:  No pedal edema. No cyanosis. No clubbing. Pulses: 2+ and symmetric in DP. Capillary refill: normal  Lymphatic:  No cervical or supraclavicular palpable lymphadenopathy. Data:       Recent Results (from the past 24 hour(s))   CBC WITH AUTOMATED DIFF    Collection Time: 05/26/19  4:12 AM   Result Value Ref Range    WBC 11.3 4.6 - 13.2 K/uL    RBC 3.66 (L) 4.70 - 5.50 M/uL    HGB 10.9 (L) 13.0 - 16.0 g/dL    HCT 33.1 (L) 36.0 - 48.0 %    MCV 90.4 74.0 - 97.0 FL    MCH 29.8 24.0 - 34.0 PG    MCHC 32.9 31.0 - 37.0 g/dL    RDW 13.0 11.6 - 14.5 %    PLATELET 451 (L) 278 - 420 K/uL    MPV 11.2 9.2 - 11.8 FL    NEUTROPHILS 81 (H) 40 - 73 %    LYMPHOCYTES 9 (L) 21 - 52 %    MONOCYTES 10 3 - 10 %    EOSINOPHILS 0 0 - 5 %    BASOPHILS 0 0 - 2 %    ABS. NEUTROPHILS 9.1 (H) 1.8 - 8.0 K/UL    ABS. LYMPHOCYTES 1.1 0.9 - 3.6 K/UL    ABS. MONOCYTES 1.1 0.05 - 1.2 K/UL    ABS. EOSINOPHILS 0.1 0.0 - 0.4 K/UL    ABS.  BASOPHILS 0.0 0.0 - 0.1 K/UL    DF AUTOMATED     MAGNESIUM    Collection Time: 05/26/19  4:12 AM   Result Value Ref Range    Magnesium 1.6 1.6 - 2.6 mg/dL   METABOLIC PANEL, COMPREHENSIVE    Collection Time: 05/26/19  4:12 AM   Result Value Ref Range    Sodium 142 136 - 145 mmol/L    Potassium 4.2 3.5 - 5.5 mmol/L    Chloride 107 100 - 108 mmol/L    CO2 25 21 - 32 mmol/L    Anion gap 10 3.0 - 18 mmol/L    Glucose 92 74 - 99 mg/dL    BUN 8 7.0 - 18 MG/DL    Creatinine 0.80 0.6 - 1.3 MG/DL    BUN/Creatinine ratio 10 (L) 12 - 20      GFR est AA >60 >60 ml/min/1.73m2    GFR est non-AA >60 >60 ml/min/1.73m2    Calcium 8.4 (L) 8.5 - 10.1 MG/DL    Bilirubin, total 0.6 0.2 - 1.0 MG/DL    ALT (SGPT) 58 16 - 61 U/L    AST (SGOT) 117 (H) 15 - 37 U/L    Alk. phosphatase 72 45 - 117 U/L    Protein, total 5.7 (L) 6.4 - 8.2 g/dL    Albumin 2.9 (L) 3.4 - 5.0 g/dL    Globulin 2.8 2.0 - 4.0 g/dL    A-G Ratio 1.0 0.8 - 1.7     PHOSPHORUS    Collection Time: 05/26/19  4:12 AM   Result Value Ref Range    Phosphorus 3.3 2.5 - 4.9 MG/DL   PTT    Collection Time: 05/26/19  4:12 AM   Result Value Ref Range    aPTT 42.9 (H) 23.0 - 36.4 SEC         Chemistry Recent Labs     05/26/19  0412 05/25/19  0256 05/24/19  1200 05/24/19  0415   GLU 92 90  --  95    143  --  145   K 4.2 4.6 3.0* 3.7    110*  --  113*   CO2 25 25  --  26   BUN 8 10  --  11   CREA 0.80 0.92  --  1.14   CA 8.4* 8.5  --  7.7*   MG 1.6 2.0  --  2.2   PHOS 3.3  --   --  2.8   AGAP 10 8  --  6   BUCR 10* 11*  --  10*   AP 72 68  --  69   TP 5.7* 5.7*  --  5.2*   ALB 2.9* 3.0*  --  2.6*   GLOB 2.8 2.7  --  2.6   AGRAT 1.0 1.1  --  1.0        Lactic Acid Lactic acid   Date Value Ref Range Status   05/22/2019 1.9 0.4 - 2.0 MMOL/L Final     No results for input(s): LAC in the last 72 hours. Liver Enzymes Protein, total   Date Value Ref Range Status   05/26/2019 5.7 (L) 6.4 - 8.2 g/dL Final     Albumin   Date Value Ref Range Status   05/26/2019 2.9 (L) 3.4 - 5.0 g/dL Final     Globulin   Date Value Ref Range Status   05/26/2019 2.8 2.0 - 4.0 g/dL Final     A-G Ratio   Date Value Ref Range Status   05/26/2019 1.0 0.8 - 1.7   Final     AST (SGOT)   Date Value Ref Range Status   05/26/2019 117 (H) 15 - 37 U/L Final     Alk.  phosphatase   Date Value Ref Range Status   05/26/2019 72 45 - 117 U/L Final     Recent Labs     05/26/19  0412 05/25/19  0256 05/24/19 0415   TP 5.7* 5.7* 5.2*   ALB 2.9* 3.0* 2.6*   GLOB 2.8 2.7 2.6   AGRAT 1.0 1.1 1.0   SGOT 117* 142* 132*   AP 72 68 69        CBC w/Diff Recent Labs     05/26/19  0412 05/25/19  0256 05/24/19 0415   WBC 11.3 11.1 11.3   RBC 3.66* 3.56* 3.46*   HGB 10.9* 10.7* 10.3*   HCT 33.1* 32.8* 31.8*   * 126* 133* GRANS 81* 79* 76*   LYMPH 9* 13* 14*   EOS 0 1 2        Cardiac Enzymes No results found for: CPK, CK, CKMMB, CKMB, RCK3, CKMBT, CKNDX, CKND1, ALVARO, TROPT, TROIQ, ROBERTH, TROPT, TNIPOC, BNP, BNPP     BNP No results found for: BNP, BNPP, XBNPT     Coagulation Recent Labs     05/26/19  0412 05/25/19  1000 05/25/19  0245  05/24/19  1030   PTP  --   --   --   --  19.2*   INR  --   --   --   --  1.6*   APTT 42.9* 88.7* 68.5*   < > >180.0*    < > = values in this interval not displayed. Thyroid  No results found for: T4, T3U, TSH, TSHEXT, TSHEXT    No results found for: T4     Urinalysis Lab Results   Component Value Date/Time    Color YELLOW 05/22/2019 02:27 PM    Appearance CLEAR 05/22/2019 02:27 PM    Specific gravity 1.018 05/22/2019 02:27 PM    pH (UA) 5.5 05/22/2019 02:27 PM    Protein 300 (A) 05/22/2019 02:27 PM    Glucose NEGATIVE  05/22/2019 02:27 PM    Ketone NEGATIVE  05/22/2019 02:27 PM    Bilirubin NEGATIVE  05/22/2019 02:27 PM    Urobilinogen 0.2 05/22/2019 02:27 PM    Nitrites NEGATIVE  05/22/2019 02:27 PM    Leukocyte Esterase NEGATIVE  05/22/2019 02:27 PM    Epithelial cells FEW 05/22/2019 02:27 PM    Bacteria 1+ (A) 05/22/2019 02:27 PM    WBC 0 to 1 05/22/2019 02:27 PM    RBC 0 to 3 05/22/2019 02:27 PM        Micro  No results for input(s): SDES, CULT in the last 72 hours. No results for input(s): CULT in the last 72 hours. ABG No results for input(s): PHI, PHI, POC2, PCO2I, PO2, PO2I, HCO3, HCO3I, FIO2, FIO2I in the last 72 hours. CT (Most Recent) (CT chest reviewed by me) Results from Hospital Encounter encounter on 05/22/19   CT HEAD WO CONT    Narrative EXAM: CT head    INDICATION: Seizure, patient unresponsive. COMPARISON: None.     TECHNIQUE: Axial CT imaging of the head was performed without intravenous  contrast.    One or more dose reduction techniques were used on this CT: automated exposure  control, adjustment of the mAs and/or kVp according to patient size, and  iterative reconstruction techniques. The specific techniques used on this CT  exam have been documented in the patient's electronic medical record. Digital  Imaging and Communications in Medicine (DICOM) format image data are available  to nonaffiliated external healthcare facilities or entities on a secure, media  free, reciprocally searchable basis with patient authorization for at least a  12-month period after this study. _______________    FINDINGS:    BRAIN AND POSTERIOR FOSSA: Cortical sulci volume is within normal limits for  patient age. The ventricular size and configuration is normal. Physiologic  bilateral basal ganglia calcifications are present. There is no intracranial  hemorrhage, mass effect, or midline shift. The gray-white matter differentiation  is within normal limits. Periventricular white matter low-attenuation is  present. EXTRA-AXIAL SPACES AND MENINGES: There are no abnormal extra-axial fluid  collections. CALVARIUM: Intact. SINUSES: Clear. OTHER: None.    _______________      Impression 1. No acute intracranial abnormality. 2. Mild periventricular white matter low-attenuation; nonspecific finding  favored to reflect sequela of chronic ischemic microvascular change. CRITICAL RESULT:  CODE S results called to Dr. Mary Celaya in the emergency room  prior to dictation at 1425 hours on 5/22/2019            XR (Most Recent). CXR  reviewed by me and compared with previous CXR Results from Hospital Encounter encounter on 05/22/19   XR CHEST PORT    Narrative EXAM: XR CHEST PORT    CLINICAL INDICATION/HISTORY: intubated  -Additional: None    COMPARISON: May 22, 2019    TECHNIQUE: Portable frontal view of the chest    _______________    FINDINGS:    SUPPORT DEVICES: Endotracheal tube with tip projecting approximately 5.9 cm  above the juwan. Right IJ central venous catheter with tip projecting over the  superior cavoatrial junction.  Nasogastric tube below the diaphragm, tip  collimated from view.    HEART AND MEDIASTINUM: Stable cardiac size and mediastinal contours. LUNGS AND PLEURAL SPACES: Linear areas of opacity at each lung base are present  without discrete pneumonic consolidation, pneumothorax, or pleural effusion. BONY THORAX AND SOFT TISSUES: Unremarkable.    _______________      Impression 1. Endotracheal tube, visualized nasogastric tube, and right IJ central venous  catheter in stable position. 2. Linear areas of basilar atelectasis without superimposed acute radiographic  abnormality. EEG 5/23/19: IMPRESSION: This EEG is abnormal due to generalized slowing and low amplitude background, which is an indicator of diffuse cerebral dysfunction from any cause including -but not limited to- toxic, metabolic and infectious etiologies. Intermittent beta-range waves are likely caused by sedative medications such as benzodiazepine. There are no ictal or interictal findings with a specific correlation with seizures. PVL LE 5/23/19:  · Chronic non-occlusive thrombus present in the right common femoral vein. · Subacute non-occlusive thrombus present in the right profunda femoral vein. · Subacute non-occlusive thrombus present in the right proximal femoral vein. · Subacute non-occlusive thrombus present in the right mid femoral vein. · Acute occlusive thrombus present in the right distal femoral vein. · Subacute non-occlusive thrombus present in the right popliteal vein. · Acute non-occlusive thrombus present in the left common femoral vein. · Acute non-occlusive thrombus present in the left saphenofemoral junction. · Subacute non-occlusive thrombus present in the left profunda femoral vein. · Subacute non-occlusive thrombus present in the left femoral vein. · Subacute non-occlusive present in the left femoral vein. · Subacute non-occlusive thrombus present in the left popliteal vein. Echo 5/23/19:  · Left Ventricle: Mild-to-moderate systolic dysfunction. Estimated left ventricular ejection fraction is 41 - 45%. E/E' ratio is 8.91. .  · Left Atrium: Mildly dilated left atrium. · Aortic Valve: Probably trileaflet aortic valve. · Mitral Valve: Trace mitral valve regurgitation. · Right Ventricle: Mildly dilated right ventricle. · Pulmonary Artery: There is no evidence of pulmonary hypertension. VQ scan 5/23/19:  Moderate perfusion defects in the right upper and left lower lobe are  intermediate probability for pulmonary embolism. Hypercoagulable w/u:   Normal/negative: homocysteine   Other w/u pending result. IMPRESSION:   · Recurrent seizure, with hx of epilepsy (home regimen keppra. Dilantin stopped recently due to interaction with coumadin)  · Left weakness, ? Pako's paralysis. Resolved. · Respiratory failure / apnea, intubated in ER 5/22/19, extubated 5/23/19  · Possible aspiration pneumonia vs pneumonitis. · B/l LE extensive acute, subacute and chronic DVT. · Intermediate probability for PE on VQ scan. · LVEF 41-45%. · Hypocalcemia  · Hypokalemia, replaced  · Hypernatremia   · Elevated ammonia likely due to seizure, resolved. · Elevated CPK, likely due to seizure  · Mildly abnormal troponin likely due to seizure  · Hx of DVT (not on anticoagulation, reported by spouse)  · THC positive   · Anemia, no bleeding. · Code status: full      RECOMMENDATIONS:   Respiratory: respi status good. No overnight respi issues. Intermediate probability for PE on VQ. Anticoagulation as below. Keep SPO2 >=92%. HOB 30 degree elevation all the time. Aggressive pulmonary toileting. Aspiration precautions. Incentive spirometry. CVS:   Echo with low LVEF, consider cardiology consult. Defer to primary team.   B/l LE PVL positive for acute subacute and chronic DVT. Intermediate probability for PE on VQ. Not sure if he was compliant to coumadin. Hypercoagulable w/u pending results. On heparin drip. Vascular surgeon appreciated.  Consider transition to Mela. Will need to follow with vascular after d/c. ID: completed augmentin to complete 5 day course for aspiration. Endotracheal aspirate cx many normal reid. Will defer respective systems problem management to primary and other respective consultant   Further recommendations will be based on the patient's response to recommended treatment and results of the investigation ordered. Quality Care: PPI, DVT prophylaxis, HOB elevated, Infection control all reviewed and addressed. Care of plan d/w RN, RT, MDR.   D/w pt updated. Moderate complexity decision making was performed during the evaluation of this patient at high risk for decompensation with multiple organ involvement. Ok to d/c from pulmonary standpoint. PCCM will sign off, call us with any questions.           Susi Camarena MD  5/26/2019

## 2019-05-26 NOTE — PROGRESS NOTES
Dual AVS reviewed with Patti Armstrong RN. All medications reviewed individually with patient. Opportunities for questions and concerns provided. Patient discharged via (mode of transport ie. Car, ambulance or air transport) car with family. Patient's arm band appropriately discarded.

## 2019-05-26 NOTE — PROGRESS NOTES
0730:Received verbal bedside report from off going nurse CHARI Rao R.N. Patient care received. Patient alert and oriented x 4. Patient resting in bed denies pain. Patient stable. Call light with in reach bed in lowest position.

## 2019-05-27 LAB
BACTERIA SPEC CULT: ABNORMAL
BACTERIA SPEC CULT: ABNORMAL
GRAM STN SPEC: ABNORMAL
SERVICE CMNT-IMP: ABNORMAL

## 2019-05-28 LAB
APCR PPP: 3.1 RATIO (ref 2.2–3.5)
AT III AG PPP IA-ACNC: 64 % (ref 72–124)
BACTERIA SPEC CULT: NORMAL
BACTERIA SPEC CULT: NORMAL
HEXAGONAL PHASE PHOSPHOLIPID, 117839: 7 SEC (ref 0–11)
INTERPRETATION, 117893: ABNORMAL
PLG PPP CHRO-ACNC: 63 % (ref 70–150)
PROT C PPP-ACNC: 59 % (ref 73–180)
PROT S ACT/NOR PPP: 95 % (ref 57–157)
PROT S PPP-ACNC: 65 % (ref 60–150)
PTT-LA MIX, LUPR1T: 50.9 SEC (ref 0–48.9)
SCREEN APTT: 55.6 SEC (ref 0–51.9)
SCREEN DRVVT: 45.7 SEC (ref 0–47)
SERVICE CMNT-IMP: NORMAL
SERVICE CMNT-IMP: NORMAL

## 2019-05-29 LAB
CARDIOLIPIN IGA SER IA-ACNC: <9 APL U/ML (ref 0–11)
CARDIOLIPIN IGG SER IA-ACNC: <9 GPL U/ML (ref 0–14)
CARDIOLIPIN IGM SER IA-ACNC: <9 MPL U/ML (ref 0–12)
F5 GENE MUT ANL BLD/T: NORMAL

## 2019-05-30 LAB — PROT C AG PPP IA-ACNC: 68 % (ref 60–150)

## 2019-06-03 LAB — MTHFR GENE MUT ANL BLD/T: NORMAL

## 2020-02-18 NOTE — PROGRESS NOTES
DC Plan: Discharge home today with MD follow up, family assistance    Chart reviewed as CM on call. Discharge order noted. Met with pt and pts fiance at bedside. Pts fiance to drive home. Home address confirmed per face sheet. Pt does not drive. Pts fiance drives to appts. Pt states he sees MD Chelo Conner @ the [address] [place] [address]. Pt has cleared PT with no HH recommendations. Pt has cane. Pt has signed [place] transfer form and it has been placed on chart. Pt asking about getting screened for Medicaid, provided [employer] contact info on dc papers. Pt provided Xarelto coupons, per provider request. No other concerns identified. CM will cont to follow as needed, [phone]. Care Management Interventions  [facility]  Mode of Transport at Discharge: Other (see comment)([facility])  Transition of Care Consult (CM Consult): Discharge Planning  Physical Therapy Consult: Yes  Speech Therapy Consult: Yes  Current Support Network:  Other(LIVES WITH [facility])  Confirm Follow Up Transport: Family  Plan discussed with Pt/Family/Caregiver: Yes  Discharge Location  Discharge Placement: Home with family assistance ok to change dressing with sterile gauze post op day #3. must be changed if it gets saturated with blood or if gauze gets wet.

## 2024-11-25 NOTE — DISCHARGE INSTRUCTIONS
Thanks for visiting us today!    Remember these important phone numbers:    [phone] for phone nurses during the day and our nurse answering service at night    [phone]   for scheduling or changing future appointments    [phone] for the [facility]    When leaving a message for our staff, please include:   the spelling of your child’s full name and date of birth  your full name and relationship to child  best phone number and time to reach you   reason for the call      We strongly recommend that all children age 6 months and older receive the COVID-19 vaccine. Call our office at [phone]  to schedule.      Where's the best place on the internet to look up health information about kids?  [email or web address]  From the American Academy of Pediatrics        Is your child signed up for [employer]? If you do this, you can message us rather than playing phone tag! You can also look at labs, pay your bills, and do some scheduling. Go to your own account first. (If you don't have one yet, you can set one up at the website below or at your doctor's office).  Using a web browser (not the phone tiara), on the right hand side of your page, click the button marked \"Request Access to my Child's Records.\" Fill out the information. In a few days your child's information will be linked to your account. It's that simple!! Here is the website for more information:    [email or web address]        --------------------------------------------------------------------------------------------------------------------       Patient Education        Seizure: Care Instructions  Your Care Instructions    Seizures are caused by abnormal patterns of electrical signals in the brain. They are different for each person. Seizures can affect movement, speech, vision, or awareness. Some people have only slight shaking of a hand and do not pass out. Other people may pass out and have violent shaking of the whole body. Some people appear to stare into space. They are awake, but they can't respond normally. Later, they may not remember what happened. You may need tests to identify the type and cause of the seizures. A seizure may occur only once, or you may have them more than one time. Taking medicines as directed and following up with your doctor may help keep you from having more seizures. The doctor has checked you carefully, but problems can develop later. If you notice any problems or new symptoms, get medical treatment right away. Follow-up care is a key part of your treatment and safety. Be sure to make and go to all appointments, and call your doctor if you are having problems. It's also a good idea to know your test results and keep a list of the medicines you take. How can you care for yourself at home? · Be safe with medicines. Take your medicines exactly as prescribed. Call your doctor if you think you are having a problem with your medicine. · Do not do any activity that could be dangerous to you or others until your doctor says it is safe to do so. For example, do not drive a car, operate machinery, swim, or climb ladders. · Be sure that anyone treating you for any health problem knows that you have had a seizure and what medicines you are taking for it. · Identify and avoid things that may make you more likely to have a seizure. These may include lack of sleep, alcohol or drug use, stress, or not eating. · Make sure you go to your follow-up appointment. When should you call for help?   Call 911 anytime you think you may need emergency care. For example, call if:    · You have another seizure.     · You have more than one seizure in 24 hours.     · You have new symptoms, such as trouble walking, speaking, or thinking clearly.    Call your doctor now or seek immediate medical care if:    · You are not acting normally.    Watch closely for changes in your health, and be sure to contact your doctor if you have any problems. Where can you learn more? Go to http://thaddeus-vidya.info/. Enter O602 in the search box to learn more about \"Seizure: Care Instructions. \"  Current as of: Mily 3, 2018  Content Version: 11.9  © 3608-9434 [employer]. Care instructions adapted under license by [employer] (which disclaims liability or warranty for this information). If you have questions about a medical condition or this instruction, always ask your healthcare professional. [address] any warranty or liability for your use of this information. Patient Education        Hypokalemia: Care Instructions  Your Care Instructions    Hypokalemia (say \"qo-cm-htt-ANN-MARIE-hossein-uh\") is a low level of potassium. The heart, muscles, kidneys, and nervous system all need potassium to work well. This problem has many different causes. Kidney problems, diet, and medicines like diuretics and laxatives can cause it. So can vomiting or diarrhea. In some cases, cancer is the cause. Your doctor may do tests to find the cause of your low potassium levels. You may need medicines to bring your potassium levels back to normal. You may also need regular blood tests to check your potassium. If you have very low potassium, you may need intravenous (IV) medicines. You also may need tests to check the electrical activity of your heart. Heart problems caused by low potassium levels can be very serious. Follow-up care is a key part of your treatment and safety.  Be sure to make and go to all appointments, and call your doctor if you are having problems. It's also a good idea to know your test results and keep a list of the medicines you take. How can you care for yourself at home? · If your doctor recommends it, eat foods that have a lot of potassium. These include fresh fruits, juices, and vegetables. They also include nuts, beans, and milk. · Be safe with medicines. If your doctor prescribes medicines or potassium supplements, take them exactly as directed. Call your doctor if you have any problems with your medicines. · Get your potassium levels tested as often as your doctor tells you. When should you call for help? Call 911 anytime you think you may need emergency care. For example, call if:    · You feel like your heart is missing beats. Heart problems caused by low potassium can cause death.     · You passed out (lost consciousness).     · You have a seizure.    Call your doctor now or seek immediate medical care if:    · You feel weak or unusually tired.     · You have severe arm or leg cramps.     · You have tingling or numbness.     · You feel sick to your stomach, or you vomit.     · You have belly cramps.     · You feel bloated or constipated.     · You have to urinate a lot.     · You feel very thirsty most of the time.     · You are dizzy or lightheaded, or you feel like you may faint.     · You feel depressed, or you lose touch with reality.    Watch closely for changes in your health, and be sure to contact your doctor if:    · You do not get better as expected. Where can you learn more? Go to http://thaddeus-vidya.info/. Enter G358 in the search box to learn more about \"Hypokalemia: Care Instructions. \"  Current as of: March 14, 2018  Content Version: 11.9  © 2504-3515 [employer]. Care instructions adapted under license by [employer] (which disclaims liability or warranty for this information).  If you have questions about a medical condition or this instruction, always ask your healthcare professional. [address] any warranty or liability for your use of this information. Patient Education        Aspiration Pneumonia: Care Instructions  Your Care Instructions    Aspiration pneumonia is an inflammation of the lungs. It may occur after you breathe in large amounts of foreign material, such as food, liquid, vomit, or mucus. Aspiration may happen because of a health problem that makes it hard to swallow. These problems include stroke or seizure. Pneumonia makes it hard to breathe. Follow-up care is a key part of your treatment and safety. Be sure to make and go to all appointments, and call your doctor if you are having problems. It's also a good idea to know your test results and keep a list of the medicines you take. How can you care for yourself at home? To help with swallowing  · You may need to do exercises to train your muscles to work together to help you swallow. You may also need to learn how to position your body or how to put food in your mouth to be able to swallow better. · You may need to change the foods you eat. Your doctor may tell you to eat certain foods and liquids to make swallowing easier. · You may need to change how you prepare foods. For example, you may need to add thickeners to some liquids, or puree certain foods in a . To help with pneumonia  · Take your antibiotics as directed. Do not stop taking them just because you feel better. You need to take the full course of antibiotics. · Take your medicines exactly as prescribed. For example, your doctor may have given you medicine that makes breathing easier. Call your doctor if you think you are having a problem with your medicine. · Get plenty of rest and sleep. You may feel weak and tired for a while, but your energy level will improve with time.   · Take care of your cough so you can rest. A cough that brings up mucus from your lungs is common with pneumonia. It is one way your body gets rid of the infection. But if coughing keeps you from resting or causes severe fatigue and chest-wall pain, talk to your doctor. He or she may suggest that you take a medicine to reduce the cough. · Use a humidifier to increase the moisture in the air. Dry air makes coughing worse. Follow the instructions for cleaning the machine. · Do not smoke, and avoid others' smoke. Smoke will make your cough last longer. If you need help quitting, talk to your doctor about stop-smoking programs and medicines. These can increase your chances of quitting for good. · Take an over-the-counter pain medicine, such as acetaminophen (Tylenol), ibuprofen (Advil, Motrin), or naproxen (Aleve) to help reduce fever and reduce chest pain caused by coughing. Read and follow all instructions on the label. · Do not take two or more pain medicines at the same time unless the doctor told you to. Many pain medicines have acetaminophen, which is Tylenol. Too much acetaminophen (Tylenol) can be harmful. When should you call for help? Call 911 anytime you think you may need emergency care. For example, call if:    · You have severe trouble breathing.    Call your doctor now or seek immediate medical care if:    · You have a new or higher fever.     · You have new or worse trouble breathing.     · You cough up blood.     · You are dizzy or lightheaded, or you feel like you may faint.    Watch closely for changes in your health, and be sure to contact your doctor if:    · You do not get better as expected.     · You are coughing more deeply or more often. Where can you learn more? Go to http://thaddeus-vidya.info/. Enter 28945 52 84 [id number] in the search box to learn more about \"Aspiration Pneumonia: Care Instructions. \"  Current as of: September 5, 2018  Content Version: 11.9  © 3639-6505 [employer].  Care instructions adapted under license by [employer] (which disclaims liability or warranty for this information). If you have questions about a medical condition or this instruction, always ask your healthcare professional. [address] any warranty or liability for your use of this information. DISCHARGE SUMMARY from Nurse    PATIENT INSTRUCTIONS:    After general anesthesia or intravenous sedation, for 24 hours or while taking prescription Narcotics:  · Limit your activities  · Do not drive and operate hazardous machinery  · Do not make important personal or business decisions  · Do  not drink alcoholic beverages  · If you have not urinated within 8 hours after discharge, please contact your surgeon on call. Report the following to your surgeon:  · Excessive pain, swelling, redness or odor of or around the surgical area  · Temperature over 100.5  · Nausea and vomiting lasting longer than 4 hours or if unable to take medications  · Any signs of decreased circulation or nerve impairment to extremity: change in color, persistent  numbness, tingling, coldness or increase pain  · Any questions    What to do at Home:    *  Please give a list of your current medications to your Primary Care Provider. *  Please update this list whenever your medications are discontinued, doses are      changed, or new medications (including over-the-counter products) are added. *  Please carry medication information at all times in case of emergency situations. These are general instructions for a healthy lifestyle:    No smoking/ No tobacco products/ Avoid exposure to second hand smoke  Surgeon General's Warning:  Quitting smoking now greatly reduces serious risk to your health.     Obesity, smoking, and sedentary lifestyle greatly increases your risk for illness    A healthy diet, regular physical exercise & weight monitoring are important for maintaining a healthy lifestyle    You may be retaining fluid if you have a history of heart failure or if you experience any of the following symptoms:  Weight gain of 3 pounds or more overnight or 5 pounds in a week, increased swelling in our hands or feet or shortness of breath while lying flat in bed. Please call your doctor as soon as you notice any of these symptoms; do not wait until your next office visit. Recognize signs and symptoms of STROKE:    F-face looks uneven    A-arms unable to move or move unevenly    S-speech slurred or non-existent    T-time-call 911 as soon as signs and symptoms begin-DO NOT go       Back to bed or wait to see if you get better-TIME IS BRAIN. Warning Signs of HEART ATTACK     Call 911 if you have these symptoms:   Chest discomfort. Most heart attacks involve discomfort in the center of the chest that lasts more than a few minutes, or that goes away and comes back. It can feel like uncomfortable pressure, squeezing, fullness, or pain.  Discomfort in other areas of the upper body. Symptoms can include pain or discomfort in one or both arms, the back, neck, jaw, or stomach.  Shortness of breath with or without chest discomfort.  Other signs may include breaking out in a cold sweat, nausea, or lightheadedness. Don't wait more than five minutes to call 911 - MINUTES MATTER! Fast action can save your life. Calling 911 is almost always the fastest way to get lifesaving treatment. Emergency Medical Services staff can begin treatment when they arrive -- up to an hour sooner than if someone gets to the hospital by car. The discharge information has been reviewed with the patient. The patient verbalized understanding. Discharge medications reviewed with the patient and spouse and appropriate educational materials and side effects teaching were provided.   ___________________________________________________________________________________________________________________________________